# Patient Record
Sex: MALE | Race: WHITE | Employment: OTHER | URBAN - METROPOLITAN AREA
[De-identification: names, ages, dates, MRNs, and addresses within clinical notes are randomized per-mention and may not be internally consistent; named-entity substitution may affect disease eponyms.]

---

## 2022-02-28 ENCOUNTER — HOSPITAL ENCOUNTER (OUTPATIENT)
Dept: PHYSICAL THERAPY | Age: 62
Discharge: HOME | End: 2022-02-28
Payer: OTHER GOVERNMENT

## 2022-02-28 PROCEDURE — 97162 PT EVAL MOD COMPLEX 30 MIN: CPT

## 2022-02-28 PROCEDURE — 97140 MANUAL THERAPY 1/> REGIONS: CPT

## 2022-02-28 PROCEDURE — 97110 THERAPEUTIC EXERCISES: CPT

## 2022-02-28 NOTE — PROGRESS NOTES
[address] [place] [address] PHYSICAL THERAPY AT [address] [place] [address] Donald Plass 52, [address] [address] [place] [address], [address]  Phone: [phone]  Fax: [phone] / [address] [address] PHYSICAL THERAPY SERVICES  Patient Name: Alissa Guajardo : 1960   Medical   Diagnosis: Left shoulder pain [M25.512] Treatment Diagnosis: S/P Left Shoulder   Onset Date: 22     Referral Source: Ulysses Velez MD Start of Care [facility]): 2022   Prior Hospitalization: See medical history Provider #: [id number]   Prior Level of Function: Independent all ADL's   Comorbidities: TB, Arthritis, Alcohol use, Cancer(Skin), C7-C3 fusion   Medications: Verified on Patient Summary List   The Plan of Care and following information is based on the information from the initial evaluation.   ==================================================================================  Assessment / key information:  Patient is a 59 y.o.  yo male with Dx of Left shoulder pain [M25.512]. Patient underwent acromial decompression and bicep tenodesis on 22. He arrived to clinic in sling. He currently rates his pain as 5/10 at worst, 2/10 at best, primarily located at L shoulder globablly. He complains of difficulty and increased pain with movement. MD precautions are no active bicep contraction for 6 weeks. Objective Findings:  Shoulder/PROM: Flx: L = 130 deg, Scaption: L = 90 deg,  ER: L = 40 deg at 42 deg scaption deg,  IR: L = 50 deg  , MMT:NT. Incisions healing with no signs of infection.    Patient can benefit from PT interventions to improve ROM, strength, flexibility, decrease pain and scar tissue, to facilitate ADLs & overall functional status.   ==================================================================================  Eval Complexity: History HIGH Complexity :3+ comorbidities / personal factors will impact the outcome/ POC ;  Examination  MEDIUM Complexity : 3 Standardized tests and measures addressing body structure, function, activity limitation and / or participation in recreation ; Presentation MEDIUM Complexity : Evolving with changing characteristics ; Decision Making MEDIUM Complexity : FOTO score of 26-74; Overall Complexity MEDIUM  Problem List: pain affecting function, decrease ROM, decrease strength, impaired gait/ balance, decrease ADL/ functional abilitiies, decrease activity tolerance, decrease flexibility/ joint mobility and other FOTO 39/100   Treatment Plan may include any combination of the following: Therapeutic exercise, Therapeutic activities, Neuromuscular re-education, Physical agent/modality, Gait/balance training, Manual therapy, Patient education, Self Care training and Functional mobility training  Patient / Family readiness to learn indicated by: asking questions, trying to perform skills and interest  Persons(s) to be included in education: patient (P)  Barriers to Learning/Limitations: None  Measures taken:    Patient Goal (s): \"Get my Shoulder back\"   Patient self reported health status: good  Rehabilitation Potential: good   Short Term Goals: To be accomplished in  2-3  weeks:  1) Patient to be independent and compliant with initial HEP to prevent further disability. 2) Patient will report decreased c/o pain to < or = 3/10 at worst to facilitate increased tolerance to ADL's with manageable sx in the L shoulder. 3) Patient to increase PROM by 10-20 deg all planes to allow for AAROM and preparation for return to ADL's   Long Term Goals:  To be accomplished in  4-6  weeks:  1) Patient to be independent & compliant with progressive HEP in preparation for D/C.  2) Patient to demonstrate full PROM in all planes to prepare for AROM and ADL's  3.) Patient to report 50% improvement in overall mobility and independence  Frequency / Duration:   Patient to be seen  2-3  times per week for 4-6  weeks:  Patient / Caregiver education and instruction: self care, activity modification, brace/ splint application and exercises  G-Codes (GP): n/a  Therapist Signature: Marizol Walter PT Date: 4/64/7664   Certification Period: n/a Time: 8:40 AM   ===========================================================================================  I certify that the above Physical Therapy Services are being furnished while the patient is under my care. I agree with the treatment plan and certify that this therapy is necessary. Physician Signature:        Date:       Time:        Kerri Ness MD  Please sign and return to [facility] or you may fax the signed copy to [phone]. Thank you.

## 2022-02-28 NOTE — PROGRESS NOTES
PHYSICAL THERAPY - DAILY TREATMENT NOTE     Patient Name: Ajay Wright        Date: 2022  : 1960   YES Patient  Verified  Visit #:     Insurance: Payor:  / Plan: Facundo Benson 74 / Product Type:  /      In time: 797 Out time: 4536   Total Treatment Time: 50     Medicare/BCBS Time Tracking (below)   Total Timed Codes (min):   1:1 Treatment Time:       TREATMENT AREA =  Left shoulder pain [M25.512]    SUBJECTIVE    Pain Level (on 0 to 10 scale):  2  / 10   Medication Changes/New allergies or changes in medical history, any new surgeries or procedures?     NO    If yes, update Summary List   Subjective Functional Status/Changes:  []  No changes reported       See POC         OBJECTIVE  Modalities Rationale:     decrease edema, decrease inflammation and decrease pain to improve patient's ability to perform ADL's w/ less pain      min [] Estim, type/location:                                      []  att     []  unatt     []  w/US     []  w/ice    []  w/heat    min []  Mechanical Traction: type/lbs                   []  pro   []  sup   []  int   []  cont    []  before manual    []  after manual    min []  Ultrasound, settings/location:      min []  Iontophoresis w/ dexamethasone, location:                                               []  take home patch       []  in clinic   10 min [x]  Ice     []  Heat    location/position: Supine to L shoulder    min []  Vasopneumatic Device, press/temp:     min []  Other:    [x] Skin assessment post-treatment (if applicable):    [x]  intact    [x]  redness- no adverse reaction     []redness - adverse reaction:      10 min Therapeutic Exercise:  [x]  See flow sheet   Rationale:      increase ROM to improve the patients ability to prepare for ADL's and AROM     10 min Manual Therapy: Technique:      [] S/DTM []IASTM [x]PROM [] Passive Stretching   []manual TPR    []Jt manipulation:Gr I [] II []  III [] IV[] V[]  Treatment Area: Rationale:      decrease pain, increase ROM and increase tissue extensibility to improve patient's ability to prepare for AAROM. The manual therapy interventions were performed at a separate and distinct time from the therapeutic activities interventions. Billed With/As:   [x] TE   [] TA   [] Neuro   [] Self Care Patient Education: [x] Review HEP    [] Progressed/Changed HEP based on:   [] positioning   [] body mechanics   [] transfers   [] heat/ice application    [] other:        Other Objective/Functional Measures:    See POC  See TE Flow sheet     Post Treatment Pain Level (on 0 to 10) scale:   0  / 10     ASSESSMENT    Assessment/Changes in Function:     See POC     []  See Progress Note/Recertification   Patient will continue to benefit from skilled PT services to modify and progress therapeutic interventions, address functional mobility deficits, address ROM deficits, address strength deficits, analyze and address soft tissue restrictions, analyze and cue movement patterns, analyze and modify body mechanics/ergonomics and assess and modify postural abnormalities to attain remaining goals.       Progress toward goals / Updated goals:    See POC     PLAN    [x]  Upgrade activities as tolerated YES Continue plan of care   []  Discharge due to :    []  Other:      Therapist: Shanelle Gomez PT    Date: 2/28/2022 Time: 8:40 AM     Future Appointments   Date Time Provider Jamila Vasquez   2/28/2022  9:45 AM Ariana Manrique PT [facility] [address]

## 2022-03-02 ENCOUNTER — HOSPITAL ENCOUNTER (OUTPATIENT)
Dept: PHYSICAL THERAPY | Age: 62
Discharge: HOME | End: 2022-03-02
Payer: OTHER GOVERNMENT

## 2022-03-02 PROCEDURE — 97110 THERAPEUTIC EXERCISES: CPT

## 2022-03-02 PROCEDURE — 97140 MANUAL THERAPY 1/> REGIONS: CPT

## 2022-03-02 NOTE — PROGRESS NOTES
PHYSICAL THERAPY - DAILY TREATMENT NOTE     Patient Name: Rivera Alert        Date: 3/2/2022  : 1960   YES Patient  Verified  Visit #:     Insurance: Payor: DORINDA / Plan: Facundo Benson 74 / Product Type:  /      In time: 269 Out time: 113   Total Treatment Time: 45     Medicare/BCBS Time Tracking (below)   Total Timed Codes (min):   1:1 Treatment Time:       TREATMENT AREA =  Left shoulder pain [M25.512]    SUBJECTIVE    Pain Level (on 0 to 10 scale):  1-2  / 10   Medication Changes/New allergies or changes in medical history, any new surgeries or procedures? NO    If yes, update Summary List   Subjective Functional Status/Changes:  []  No changes reported       Functional improvements: \"Doing good.   Trying not to cheat by using my arm in the sling\"  Functional impairments: Decreased ROM and strength affecting ADL's         OBJECTIVE  Modalities Rationale:     decrease edema, decrease inflammation and decrease pain to improve patient's ability to perform ADL's w/o pain      min [] Estim, type/location:                                      []  att     []  unatt     []  w/US     []  w/ice    []  w/heat    min []  Mechanical Traction: type/lbs                   []  pro   []  sup   []  int   []  cont    []  before manual    []  after manual    min []  Ultrasound, settings/location:      min []  Iontophoresis w/ dexamethasone, location:                                               []  take home patch       []  in clinic   10 min [x]  Ice     []  Heat    location/position: Supine to L shoulder    min []  Vasopneumatic Device, press/temp:  If using vaso (only need to measure limb vaso being performed on)      pre-treatment girth :       post-treatment girth :       measured at (landmark location) :       min []  Other:    [x] Skin assessment post-treatment (if applicable):    [x]  intact    [x]  redness- no adverse reaction     []redness - adverse reaction:      15 min Therapeutic Exercise:  [x]  See flow sheet   Rationale:      increase ROM and increase strength to improve the patients ability to prepare for AAROM and improve ADL's     20 min Manual Therapy: Technique:      [x] S/DTM []IASTM [x]PROM [] Passive Stretching   []manual TPR    []Jt manipulation:Gr I [] II []  III [] IV[] V[]  Treatment Area: All planes Scar mobilization   Rationale:      increase ROM and increase tissue extensibility to improve patient's ability to prepare for AAROM and ADL's. The manual therapy interventions were performed at a separate and distinct time from the therapeutic activities interventions. Billed With/As:   [x] TE   [] TA   [] Neuro   [] Self Care Patient Education: [x] Review HEP    [] Progressed/Changed HEP based on:   [] positioning   [] body mechanics   [] transfers   [] heat/ice application    [] other:        Other Objective/Functional Measures:    Initiated therex per flow sheet. Post Treatment Pain Level (on 0 to 10) scale:   0  / 10     ASSESSMENT    Assessment/Changes in Function:     Patient tolerated initiation of treatment well. Continues to have increased muscle guarding with PROM. Slight improvement in PROM from last visit. Good demonstration of precautions and HEP. []  See Progress Note/Recertification   Patient will continue to benefit from skilled PT services to modify and progress therapeutic interventions, address functional mobility deficits, address ROM deficits, address strength deficits, analyze and address soft tissue restrictions, analyze and cue movement patterns, analyze and modify body mechanics/ergonomics and assess and modify postural abnormalities to attain remaining goals.       Progress toward goals / Updated goals:    Progressing towards newly established goals     PLAN    [x]  Upgrade activities as tolerated YES Continue plan of care   []  Discharge due to :    []  Other:      Therapist: Zachariah Dao PT    Date: 3/2/2022 Time: 8:06 AM     Future Appointments   Date Time Provider Jamila Vasquez   3/2/2022 10:45 AM Chas Flatter, PT [facility] [address]   3/7/2022  9:15 AM Chas Flatter, PT [facility] [address]   3/9/2022  9:15 AM Chas Flatter, PT [facility] [address]   3/14/2022  9:15 AM Chas Flatter, PT [facility] [address]   3/16/2022  9:15 AM Chas Flatter, PT [facility] [address]   3/21/2022  9:15 AM Chas Flatter, PT [facility] [address]   3/23/2022  9:15 AM Chas Flatter, PT [facility] [address]   3/28/2022  9:15 AM Chas Flatter, PT [facility] [address]   3/30/2022  9:15 AM Chas Flatter, PT [facility] [address]   4/4/2022  9:15 AM Chas Flatter, PT [facility] [address]   4/6/2022  9:15 AM Chas Flatter, PT [facility] [address]

## 2022-03-07 ENCOUNTER — HOSPITAL ENCOUNTER (OUTPATIENT)
Dept: PHYSICAL THERAPY | Age: 62
Discharge: HOME | End: 2022-03-07
Payer: OTHER GOVERNMENT

## 2022-03-07 PROCEDURE — 97110 THERAPEUTIC EXERCISES: CPT

## 2022-03-07 PROCEDURE — 97140 MANUAL THERAPY 1/> REGIONS: CPT

## 2022-03-07 NOTE — PROGRESS NOTES
PHYSICAL THERAPY - DAILY TREATMENT NOTE     Patient Name: Yesenia Souza        Date: 3/7/2022  : 1960   YES Patient  Verified  Visit #:   3   of   12  Insurance: Payor: DORINDA / Plan: Facundo Benson 74 / Product Type:  /      In time: 363 Out time: 955   Total Treatment Time: 40     Medicare/BCBS Time Tracking (below)   Total Timed Codes (min):   1:1 Treatment Time:       TREATMENT AREA =  Left shoulder pain [M25.512]    SUBJECTIVE    Pain Level (on 0 to 10 scale):  1  / 10   Medication Changes/New allergies or changes in medical history, any new surgeries or procedures? NO    If yes, update Summary List   Subjective Functional Status/Changes:  []  No changes reported       Functional improvements: \"I'm doing good. Trying not to cheat with bending my elbow. \"  Functional impairments: Decreased ROM and strength affecting ADL's.          OBJECTIVE  Modalities Rationale:     decrease inflammation and decrease pain to improve patient's ability to perform ADL's w/o pain      min [] Estim, type/location:                                      []  att     []  unatt     []  w/US     []  w/ice    []  w/heat    min []  Mechanical Traction: type/lbs                   []  pro   []  sup   []  int   []  cont    []  before manual    []  after manual    min []  Ultrasound, settings/location:      min []  Iontophoresis w/ dexamethasone, location:                                               []  take home patch       []  in clinic   10 min [x]  Ice     []  Heat    location/position: Supine to L shoulder post treatment    min []  Vasopneumatic Device, press/temp:  If using vaso (only need to measure limb vaso being performed on)      pre-treatment girth :       post-treatment girth :       measured at (landmark location) :       min []  Other:    [x] Skin assessment post-treatment (if applicable):    [x]  intact    [x]  redness- no adverse reaction     []redness - adverse reaction:      10 min Therapeutic Exercise:  [x]  See flow sheet   Rationale:      increase ROM and increase strength to improve the patients ability to prepare for AAROM and ADL's     20 min Manual Therapy: Technique:      [] S/DTM []IASTM [x]PROM [x] Passive Stretching   []manual TPR    []Jt manipulation:Gr I [] II []  III [] IV[] V[]  Treatment Area:  PROM all planes   Rationale:      increase ROM and increase tissue extensibility to improve patient's ability to improve ROM for progression to AROM and increased use with ADL's. The manual therapy interventions were performed at a separate and distinct time from the therapeutic activities interventions. Billed With/As:   [x] TE   [] TA   [] Neuro   [] Self Care Patient Education: [x] Review HEP    [] Progressed/Changed HEP based on:   [] positioning   [] body mechanics   [] transfers   [] heat/ice application    [] other:        Other Objective/Functional Measures:    PROM Flx:140, Scap: 115,      Post Treatment Pain Level (on 0 to 10) scale:   0  / 10     ASSESSMENT    Assessment/Changes in Function:     Improved PROM in all planes noted today. Continues to need vc's for no AROM of the elbow. []  See Progress Note/Recertification   Patient will continue to benefit from skilled PT services to modify and progress therapeutic interventions, address functional mobility deficits, address ROM deficits, address strength deficits, analyze and address soft tissue restrictions, analyze and cue movement patterns, analyze and modify body mechanics/ergonomics and assess and modify postural abnormalities to attain remaining goals. Progress toward goals / Updated goals:    1) Patient to be independent and compliant with initial HEP to prevent further disability. MET  2) Patient will report decreased c/o pain to < or = 3/10 at worst to facilitate increased tolerance to ADL's with manageable sx in the L shoulder.  MET  3) Patient to increase PROM by 10-20 deg all planes to allow for AAROM and preparation for return to ADL's Good Progress     PLAN    [x]  Upgrade activities as tolerated YES Continue plan of care   []  Discharge due to :    []  Other:      Therapist: Daisy Hernandez PT    Date: 3/7/2022 Time: 8:02 AM     Future Appointments   Date Time Provider Jamila Vasquez   3/7/2022  9:15 AM Hannah Vargas, PT [facility]   3/9/2022  9:15 AM Hannah Vargas, PT [facility]   3/14/2022  9:15 AM Hannah Vargas, PT [facility]   3/16/2022  9:15 AM Hannah Vargas, PT [facility]   3/21/2022  9:15 AM Hannah Vargas, PT [facility]   3/23/2022  9:15 AM Hannah Vargas, PT [facility]   4/4/2022  9:15 AM Hannah Vargas, PT [facility]   4/6/2022  9:15 AM Hannah Vargas, PT [facility]

## 2022-03-09 ENCOUNTER — HOSPITAL ENCOUNTER (OUTPATIENT)
Dept: PHYSICAL THERAPY | Age: 62
Discharge: HOME | End: 2022-03-09
Payer: OTHER GOVERNMENT

## 2022-03-09 PROCEDURE — 97140 MANUAL THERAPY 1/> REGIONS: CPT

## 2022-03-09 PROCEDURE — 97110 THERAPEUTIC EXERCISES: CPT

## 2022-03-09 NOTE — PROGRESS NOTES
PHYSICAL THERAPY - DAILY TREATMENT NOTE     Patient Name: Patricia Morrow        Date: 3/9/2022  : 1960   YES Patient  Verified  Visit #:     Insurance: Payor:  / Plan: Facundo Benson 74 / Product Type:  /      In time: 902 Out time: 955   Total Treatment Time: 40     Medicare/BCBS Time Tracking (below)   Total Timed Codes (min):   1:1 Treatment Time:       TREATMENT AREA =  Left shoulder pain [M25.512]    SUBJECTIVE    Pain Level (on 0 to 10 scale):  0  / 10   Medication Changes/New allergies or changes in medical history, any new surgeries or procedures? NO    If yes, update Summary List   Subjective Functional Status/Changes:  []  No changes reported       Functional improvements:  \"Kind of stiff today but doing ok\"  Functional impairments: Decreased strength and ROM limiting ADL's         OBJECTIVE  Modalities Rationale:     decrease inflammation and decrease pain to improve patient's ability to perform  ADL's      min [] Estim, type/location:                                      []  att     []  unatt     []  w/US     []  w/ice    []  w/heat    min []  Mechanical Traction: type/lbs                   []  pro   []  sup   []  int   []  cont    []  before manual    []  after manual    min []  Ultrasound, settings/location:      min []  Iontophoresis w/ dexamethasone, location:                                               []  take home patch       []  in clinic   10 min [x]  Ice     []  Heat    location/position: Supine to L shoulder    min []  Vasopneumatic Device, press/temp:  If using vaso (only need to measure limb vaso being performed on)      pre-treatment girth :       post-treatment girth :       measured at (landmark location) :       min []  Other:    [x] Skin assessment post-treatment (if applicable):    [x]  intact    [x]  redness- no adverse reaction     []redness - adverse reaction:      10 min Therapeutic Exercise:  [x]  See flow sheet   Rationale:      increase ROM and increase strength to improve the patients ability to prepare for AAROM     20 min Manual Therapy: Technique:      [] S/DTM []IASTM [x]PROM [x] Passive Stretching   []manual TPR    []Jt manipulation:Gr I [] II []  III [] IV[] V[]  Treatment Area:     Rationale:      increase ROM and increase tissue extensibility to improve patient's ability to prepare for AAROM and ADL's. The manual therapy interventions were performed at a separate and distinct time from the therapeutic activities interventions. Billed With/As:   [x] TE   [] TA   [] Neuro   [] Self Care Patient Education: [x] Review HEP    [] Progressed/Changed HEP based on:   [] positioning   [] body mechanics   [] transfers   [] heat/ice application    [] other:        Other Objective/Functional Measures:    140 flex, 100 scaption   Post Treatment Pain Level (on 0 to 10) scale:   0  / 10     ASSESSMENT    Assessment/Changes in Function:     Increased muscle guarding noted today in scaption. Increased difficulty with PROM. []  See Progress Note/Recertification   Patient will continue to benefit from skilled PT services to modify and progress therapeutic interventions, address functional mobility deficits, address ROM deficits, address strength deficits, analyze and address soft tissue restrictions, analyze and cue movement patterns, analyze and modify body mechanics/ergonomics and assess and modify postural abnormalities to attain remaining goals. Progress toward goals / Updated goals:    1) Patient to be independent and compliant with initial HEP to prevent further disability. MET  2) Patient will report decreased c/o pain to < or = 3/10 at worst to facilitate increased tolerance to ADL's with manageable sx in the L shoulder.  MET  3) Patient to increase PROM by 10-20 deg all planes to allow for AAROM and preparation for return to ADL's Good Progress     PLAN    [x]  Upgrade activities as tolerated YES Continue plan of care   []  Discharge due to :    []  Other:      Therapist: Yvon Duarte PT    Date: 3/9/2022 Time: 8:06 AM     Future Appointments   Date Time Provider Jamila Vasquez   3/9/2022  9:15 AM Jolene Esquivel, PT [facility]   3/14/2022  9:15 AM Jolene Esquivel, PT [facility]   3/16/2022  9:15 AM Jolene Esquivel, PT [facility]   3/21/2022  9:15 AM Jolene Esquivel, PT [facility]   3/23/2022  9:15 AM Jolene Esquivel, PT [facility]   4/4/2022  9:15 AM Jolene Esquviel, PT [facility]   4/6/2022  9:15 AM Jolene Esquivel, PT [facility]

## 2022-03-14 ENCOUNTER — HOSPITAL ENCOUNTER (OUTPATIENT)
Dept: PHYSICAL THERAPY | Age: 62
Discharge: HOME | End: 2022-03-14
Payer: OTHER GOVERNMENT

## 2022-03-14 PROCEDURE — 97110 THERAPEUTIC EXERCISES: CPT

## 2022-03-14 PROCEDURE — 97140 MANUAL THERAPY 1/> REGIONS: CPT

## 2022-03-14 NOTE — PROGRESS NOTES
PHYSICAL THERAPY - DAILY TREATMENT NOTE     Patient Name: Bianka Valdovinos        Date: 3/14/2022  : 1960   YES Patient  Verified  Visit #:     Insurance: Payor:  / Plan: Facundo Benson 74 / Product Type:  /      In time: 469 Out time: 955   Total Treatment Time: 40     Medicare/BCBS Time Tracking (below)   Total Timed Codes (min):   1:1 Treatment Time:       TREATMENT AREA =  Left shoulder pain [M25.512]    SUBJECTIVE    Pain Level (on 0 to 10 scale):  1  / 10   Medication Changes/New allergies or changes in medical history, any new surgeries or procedures? NO    If yes, update Summary List   Subjective Functional Status/Changes:  []  No changes reported       Functional improvements: \"Trying not to use it. \"  Functional impairments: Decreased ROM and strength affecting ADL's         OBJECTIVE  Modalities Rationale:     decrease inflammation and decrease pain to improve patient's ability to perform ADL's w/ less pain      min [] Estim, type/location:                                      []  att     []  unatt     []  w/US     []  w/ice    []  w/heat    min []  Mechanical Traction: type/lbs                   []  pro   []  sup   []  int   []  cont    []  before manual    []  after manual    min []  Ultrasound, settings/location:      min []  Iontophoresis w/ dexamethasone, location:                                               []  take home patch       []  in clinic   10 min [x]  Ice     []  Heat    location/position: Supine to L shoulder    min []  Vasopneumatic Device, press/temp:  If using vaso (only need to measure limb vaso being performed on)      pre-treatment girth :       post-treatment girth :       measured at (landmark location) :       min []  Other:    [x] Skin assessment post-treatment (if applicable):    [x]  intact    [x]  redness- no adverse reaction     []redness - adverse reaction:      10 min Therapeutic Exercise:  [x]  See flow sheet   Rationale: increase ROM and increase strength to improve the patients ability to prepare for AAROM w/ ADL's     20 min Manual Therapy: Technique:      [] S/DTM []IASTM []PROM [x] Passive Stretching   []manual TPR    []Jt manipulation:Gr I [] II []  III [] IV[] V[]  Treatment Area:  PROM all planes   Rationale:      decrease pain, increase ROM and increase tissue extensibility to improve patient's ability to prepare for AAROM. The manual therapy interventions were performed at a separate and distinct time from the therapeutic activities interventions. Other Objective/Functional Measures:    Progressed  strength and added 1# weight to wrist flx/ext  PROM flex 145, scap 130, ER 45 at 50 deg scap   Post Treatment Pain Level (on 0 to 10) scale:   0  / 10     ASSESSMENT    Assessment/Changes in Function:     Patient progressing slowly with ROM, continues to have increased tightness  In all planes with mild pain at [facility] [place] joint. []  See Progress Note/Recertification   Patient will continue to benefit from skilled PT services to modify and progress therapeutic interventions, address functional mobility deficits, address ROM deficits, address strength deficits, analyze and address soft tissue restrictions, analyze and cue movement patterns, analyze and modify body mechanics/ergonomics and assess and modify postural abnormalities to attain remaining goals. Progress toward goals / Updated goals:    1) Patient to be independent and compliant with initial HEP to prevent further disability.  MET  2) Patient will report decreased c/o pain to < or = 3/10 at worst to facilitate increased tolerance to ADL's with manageable sx in the L shoulder.  MET  3) Patient to increase PROM by 10-20 deg all planes to allow for AAROM and preparation for return to ADL's MET     PLAN    [x]  Upgrade activities as tolerated YES Continue plan of care   []  Discharge due to :    []  Other:      Therapist: Alex Joseph, PT    Date: 3/14/2022 Time: 8: 07 AM     Future Appointments   Date Time Provider Jamila Vasquez   3/14/2022  9:15 AM Rod Contreras, PT [facility]   3/16/2022  9:15 AM Rod Contreras, PT [facility]   3/21/2022  9:15 AM Rod Contreras, PT [facility]   3/23/2022  9:15 AM Rod Contreras, PT [facility]   4/4/2022  9:15 AM Rod Contreras, PT [facility]   4/6/2022  9:15 AM Rod Contreras, PT [facility]

## 2022-03-16 ENCOUNTER — HOSPITAL ENCOUNTER (OUTPATIENT)
Dept: PHYSICAL THERAPY | Age: 62
Discharge: HOME | End: 2022-03-16
Payer: OTHER GOVERNMENT

## 2022-03-16 PROCEDURE — 97530 THERAPEUTIC ACTIVITIES: CPT

## 2022-03-16 PROCEDURE — 97140 MANUAL THERAPY 1/> REGIONS: CPT

## 2022-03-16 PROCEDURE — 97110 THERAPEUTIC EXERCISES: CPT

## 2022-03-16 NOTE — PROGRESS NOTES
PHYSICAL THERAPY - DAILY TREATMENT NOTE     Patient Name: Josue Sanches        Date: 3/16/2022  : 1960   YES Patient  Verified  Visit #:     Insurance: Payor: DORINDA / Plan: Facundo Benson 74 / Product Type:  /      In time: 201 Out time: 9595   Total Treatment Time: 60     Medicare/BCBS Time Tracking (below)   Total Timed Codes (min):   1:1 Treatment Time:       TREATMENT AREA =  Left shoulder pain [M25.512]    SUBJECTIVE    Pain Level (on 0 to 10 scale):  1  / 10   Medication Changes/New allergies or changes in medical history, any new surgeries or procedures?     NO    If yes, update Summary List   Subjective Functional Status/Changes:  []  No changes reported       Functional improvements: Decreased pain overall  Functional impairments: Decreased AROM and strength affecting all ADL's         OBJECTIVE  Modalities Rationale:     decrease inflammation and decrease pain to improve patient's ability to perform ADL's w/o pain      min [] Estim, type/location:                                      []  att     []  unatt     []  w/US     []  w/ice    []  w/heat    min []  Mechanical Traction: type/lbs                   []  pro   []  sup   []  int   []  cont    []  before manual    []  after manual    min []  Ultrasound, settings/location:      min []  Iontophoresis w/ dexamethasone, location:                                               []  take home patch       []  in clinic   10 min [x]  Ice     []  Heat    location/position: Supine to Left shoulder following treatment session    min []  Vasopneumatic Device, press/temp:  If using vaso (only need to measure limb vaso being performed on)      pre-treatment girth :       post-treatment girth :       measured at (landmark location) :       min []  Other:    [x] Skin assessment post-treatment (if applicable):    [x]  intact    [x]  redness- no adverse reaction     []redness - adverse reaction:      25 min Therapeutic Exercise:  [x]  See flow sheet   Rationale:      increase ROM and increase strength to improve the patients ability to prepare for AROM w/ ADL's     10 min Therapeutic Activity: [x]  See flow sheet   Rationale:      increase ROM and increase strength to improve the patients ability to perform ADL's w/ increased ease     15 min Manual Therapy: Technique:      [] S/DTM []IASTM [x]PROM [x] Passive Stretching   []manual TPR    []Jt manipulation:Gr I [] II []  III [] IV[] V[]  Treatment Area:  Left shoulder all planes   Rationale:      decrease pain, increase ROM and increase tissue extensibility to improve patient's ability to perform ADL's w/ increased ease. The manual therapy interventions were performed at a separate and distinct time from the therapeutic activities interventions. Billed With/As:   [x] TE   [x] TA   [] Neuro   [] Self Care Patient Education: [x] Review HEP    [x] Progressed/Changed HEP based on:   [] positioning   [] body mechanics   [] transfers   [] heat/ice application    [x] other: Protocol       Other Objective/Functional Measures:    Progressed exercises/activities per flow sheet. Patient given written copy     Post Treatment Pain Level (on 0 to 10) scale:   0  / 10     ASSESSMENT    Assessment/Changes in Function:     Good tolerance to new exercises today. Continues to make gains in ROM. Limited in scaption today. []  See Progress Note/Recertification   Patient will continue to benefit from skilled PT services to modify and progress therapeutic interventions, address functional mobility deficits, address ROM deficits, address strength deficits, analyze and address soft tissue restrictions, analyze and cue movement patterns, analyze and modify body mechanics/ergonomics and assess and modify postural abnormalities to attain remaining goals.       Progress toward goals / Updated goals:  Progressing towards all LTG's:  1) Patient to be independent & compliant with progressive HEP in preparation for D/C.  2) Patient to demonstrate full PROM in all planes to prepare for AROM and ADL's  3.) Patient to report 50% improvement in overall mobility and independence     PLAN    [x]  Upgrade activities as tolerated YES Continue plan of care   []  Discharge due to :    []  Other:      Therapist: Juan Jose Leon PT    Date: 3/16/2022 Time: 8:23 AM     Future Appointments   Date Time Provider Jamila Vasquez   3/16/2022  9:15 AM Tonja Bhandari, PT [facility]   3/21/2022  9:15 AM Tonja Bhandari, PT [facility]   3/23/2022  9:15 AM Tonja Bhandari, PT [facility]   4/4/2022  9:15 AM Tonja Bhandari, PT [facility]   4/6/2022  9:15 AM Tonja Bhandari, PT [facility]   4/12/2022  9:00 AM Jonathan Morrell, PT [facility]   4/14/2022  9:45 AM Carlos Galaviz [facility]   4/18/2022  9:15 AM Tonja Bhandari, PT [facility]   4/20/2022  9:15 AM Tonja Bhandari, PT [facility]

## 2022-03-21 ENCOUNTER — HOSPITAL ENCOUNTER (OUTPATIENT)
Dept: PHYSICAL THERAPY | Age: 62
Discharge: HOME | End: 2022-03-21
Payer: OTHER GOVERNMENT

## 2022-03-21 PROCEDURE — 97140 MANUAL THERAPY 1/> REGIONS: CPT

## 2022-03-21 PROCEDURE — 97112 NEUROMUSCULAR REEDUCATION: CPT

## 2022-03-21 PROCEDURE — 97530 THERAPEUTIC ACTIVITIES: CPT

## 2022-03-21 PROCEDURE — 97110 THERAPEUTIC EXERCISES: CPT

## 2022-03-21 NOTE — PROGRESS NOTES
PHYSICAL THERAPY - DAILY TREATMENT NOTE     Patient Name: Delisa Ballard        Date: 3/21/2022  : 1960   YES Patient  Verified  Visit #:     Insurance: Payor: DORINDA / Plan: Facundo Benson 74 / Product Type:  /      In time: 660 Out time: 8186   Total Treatment Time: 60     Medicare/BCBS Time Tracking (below)   Total Timed Codes (min):   1:1 Treatment Time:       TREATMENT AREA =  Left shoulder pain [M25.512]    SUBJECTIVE    Pain Level (on 0 to 10 scale):  1  / 10   Medication Changes/New allergies or changes in medical history, any new surgeries or procedures? NO    If yes, update Summary List   Subjective Functional Status/Changes:  []  No changes reported       Functional improvements: \"I get to come out of my sling on Wednesday. \"  Functional impairments: Decreased strength and AROM affecting ADL's         OBJECTIVE  Modalities Rationale:     decrease inflammation and decrease pain to improve patient's ability to perform ADL's w/o pain      min [] Estim, type/location:                                      []  att     []  unatt     []  w/US     []  w/ice    []  w/heat    min []  Mechanical Traction: type/lbs                   []  pro   []  sup   []  int   []  cont    []  before manual    []  after manual    min []  Ultrasound, settings/location:      min []  Iontophoresis w/ dexamethasone, location:                                               []  take home patch       []  in clinic   10 min [x]  Ice     []  Heat    location/position: Supine to left shoulder    min []  Vasopneumatic Device, press/temp:  If using vaso (only need to measure limb vaso being performed on)      pre-treatment girth :       post-treatment girth :       measured at (landmark location) :       min []  Other:    [x] Skin assessment post-treatment (if applicable):    [x]  intact    [x]  redness- no adverse reaction     []redness - adverse reaction:      15 min Therapeutic Exercise:  [x]  See flow sheet Rationale:      increase ROM and increase strength to improve the patients ability to perform ADL's w/ good ROM and decreased pain     10 min Therapeutic Activity: []  See flow sheet   Rationale:      increase ROM and increase strength to improve the patients ability to perform dressing ADL's and prepare for AROM overhead     10 min Neuromuscular Re-ed: [x]  See flow sheet   Rationale:      increase ROM, increase strength and improve coordination to improve the patients ability to perform AROM w/ proper scapulohumeral rhythm and shoulder mechanics     15 min Manual Therapy: Technique:      [] S/DTM []IASTM [x]PROM [x] Passive Stretching   []manual TPR    [x]Jt manipulation:Gr I [] II [x]  III [] IV[] V[]  Treatment Area:  PROM all planes, gentle mobilization to dec muscle guarding   Rationale:      decrease pain, increase ROM and increase tissue extensibility to improve patient's ability to perform AAROM throughout full range. The manual therapy interventions were performed at a separate and distinct time from the therapeutic activities interventions. Billed With/As:   [x] TE   [x] TA   [x] Neuro   [] Self Care Patient Education: [x] Review HEP    [] Progressed/Changed HEP based on:   [] positioning   [] body mechanics   [] transfers   [] heat/ice application    [] other:        Other Objective/Functional Measures: Added sleeper stretch and posterior capsule stretch, S/L Abd and ER, Active elbow flexion w/o resistance, and supine horizontal ABD w/o resistance   Post Treatment Pain Level (on 0 to 10) scale:   0  / 10     ASSESSMENT    Assessment/Changes in Function:     Patient tolerated new therex and activity well. Notable bicep fatigue but no increase in pain. Improved flexion and ABD following grade II oscillations and mobs.   Significant tightness in IR.     []  See Progress Note/Recertification   Patient will continue to benefit from skilled PT services to modify and progress therapeutic interventions, address functional mobility deficits, address ROM deficits, address strength deficits, analyze and address soft tissue restrictions, analyze and cue movement patterns, analyze and modify body mechanics/ergonomics and assess and modify postural abnormalities to attain remaining goals.       Progress toward goals / Updated goals:  Good Progress towards all LTG's:  1) Patient to be independent & compliant with progressive HEP in preparation for D/C.  2) Patient to demonstrate full PROM in all planes to prepare for AROM and ADL's  3.) Patient to report 50% improvement in overall mobility and independence     PLAN    [x]  Upgrade activities as tolerated YES Continue plan of care   []  Discharge due to :    []  Other:      Therapist: Richard Morton PT    Date: 3/21/2022 Time: 8:07 AM     Future Appointments   Date Time Provider Jamila Vasquez   3/21/2022  9:15 AM Shania Ripa, PT [facility]   3/23/2022  9:15 AM Shania Ripa, PT [facility]   4/4/2022  9:15 AM Shania Ripa, PT [facility]   4/6/2022  9:15 AM Shania Ripa, PT [facility]   4/12/2022  9:00 AM Slade Barber, PT [facility]   4/14/2022  9:45 AM Gurney Denver [facility]   4/18/2022  9:15 AM Shania Ripa, PT [facility]   4/20/2022  9:15 AM Shania Ripa, PT [facility]

## 2022-03-23 ENCOUNTER — HOSPITAL ENCOUNTER (OUTPATIENT)
Dept: PHYSICAL THERAPY | Age: 62
Discharge: HOME | End: 2022-03-23
Payer: OTHER GOVERNMENT

## 2022-03-23 PROCEDURE — 97530 THERAPEUTIC ACTIVITIES: CPT

## 2022-03-23 PROCEDURE — 97112 NEUROMUSCULAR REEDUCATION: CPT

## 2022-03-23 PROCEDURE — 97140 MANUAL THERAPY 1/> REGIONS: CPT

## 2022-03-23 PROCEDURE — 97110 THERAPEUTIC EXERCISES: CPT

## 2022-03-23 NOTE — PROGRESS NOTES
PHYSICAL THERAPY - DAILY TREATMENT NOTE     Patient Name: Mynor Sarabia        Date: 3/23/2022  : 1960   YES Patient  Verified  Visit #:     Insurance: Payor:  / Plan: Facundo Benson 74 / Product Type:  /      In time: 343 Out time: 6403   Total Treatment Time: 60     Medicare/BCBS Time Tracking (below)   Total Timed Codes (min):   1:1 Treatment Time:       TREATMENT AREA =  Left shoulder pain [M25.512]    SUBJECTIVE    Pain Level (on 0 to 10 scale):  1  / 10   Medication Changes/New allergies or changes in medical history, any new surgeries or procedures?     NO    If yes, update Summary List   Subjective Functional Status/Changes:  []  No changes reported       Functional improvements: \"I am out of the sling today\"  Functional impairments: decreased AROM and strength affecting all ADL's         OBJECTIVE  Modalities Rationale:     decrease inflammation and decrease pain to improve patient's ability to perform ADL's w/o pain      min [] Estim, type/location:                                      []  att     []  unatt     []  w/US     []  w/ice    []  w/heat    min []  Mechanical Traction: type/lbs                   []  pro   []  sup   []  int   []  cont    []  before manual    []  after manual    min []  Ultrasound, settings/location:      min []  Iontophoresis w/ dexamethasone, location:                                               []  take home patch       []  in clinic   10 min [x]  Ice     []  Heat    location/position: Supine to L shoulder following treatment    min []  Vasopneumatic Device, press/temp:  If using vaso (only need to measure limb vaso being performed on)      pre-treatment girth :       post-treatment girth :       measured at (landmark location) :       min []  Other:    [x] Skin assessment post-treatment (if applicable):    [x]  intact    [x]  redness- no adverse reaction     []redness - adverse reaction:      10 min Therapeutic Exercise:  [x]  See flow sheet   Rationale:      increase ROM and increase strength to improve the patients ability to reach overhead     10 min Therapeutic Activity: [x]  See flow sheet   Rationale:      increase ROM and increase strength to improve the patients ability to perform ADL's such as dressing and reaching while driving     15 min Neuromuscular Re-ed: [x]  See flow sheet Rhythmic stabilization followed by PNF D2 in supine with assist and passive stretch at end range. Rationale:      increase strength, improve coordination and increase proprioception to improve the patients ability to perform ADL's w/ proper shoulder mechanics     15 min Manual Therapy: Technique:      [] S/DTM []IASTM [x]PROM [x] Passive Stretching   []manual TPR    [x]Jt manipulation:Gr I [] II [x]  III [x] IV[] V[]  Treatment Area: post/inf jt mobs, PROM all planes     Rationale:      decrease pain, increase ROM and increase tissue extensibility to improve patient's ability to perform OH activities. The manual therapy interventions were performed at a separate and distinct time from the therapeutic activities interventions. Billed With/As:   [x] TE   [x] TA   [x] Neuro   [] Self Care Patient Education: [x] Review HEP    [] Progressed/Changed HEP based on:   [] positioning   [] body mechanics   [] transfers   [] heat/ice application    [] other:        Other Objective/Functional Measures:    Supine PNF D2 pattern w/ assist. Increased reps per flow sheet  PROM: Flex 140, scaption: 138, ER 65, IR 55     Post Treatment Pain Level (on 0 to 10) scale:   0  / 10     ASSESSMENT    Assessment/Changes in Function:     Patient continues to make slow gains in A/PROM and strength as noted per increased tolerance to AROM activities.      []  See Progress Note/Recertification   Patient will continue to benefit from skilled PT services to modify and progress therapeutic interventions, address functional mobility deficits, address ROM deficits, address strength deficits, analyze and address soft tissue restrictions, analyze and cue movement patterns, analyze and modify body mechanics/ergonomics and assess and modify postural abnormalities to attain remaining goals.       Progress toward goals / Updated goals:    Good Progress towards all LTG's:  1) Patient to be independent & compliant with progressive HEP in preparation for D/C.  2) Patient to demonstrate full PROM in all planes to prepare for AROM and ADL's  3.) Patient to report 50% improvement in overall mobility and independence     PLAN    [x]  Upgrade activities as tolerated YES Continue plan of care   []  Discharge due to :    []  Other:      Therapist: Juan Jose Leon PT    Date: 3/23/2022 Time: 8:02 AM     Future Appointments   Date Time Provider Jamila Vasquez   3/23/2022  9:15 AM Tonja Bhandari PT [facility]   4/4/2022  9:15 AM Tonja Bhandari PT [facility]   4/6/2022  9:15 AM Tonja Bhandari PT [facility]   4/12/2022  9:00 AM Jonathan Morrell PT [facility]   4/14/2022  9:45 AM Carlos Galaviz [facility]   4/18/2022  9:15 AM Tonja Bhandari PT [facility]   4/20/2022  9:15 AM Tonja Bhandari PT [facility]

## 2022-03-28 ENCOUNTER — APPOINTMENT (OUTPATIENT)
Dept: PHYSICAL THERAPY | Age: 62
End: 2022-03-28
Payer: OTHER GOVERNMENT

## 2022-03-30 ENCOUNTER — APPOINTMENT (OUTPATIENT)
Dept: PHYSICAL THERAPY | Age: 62
End: 2022-03-30
Payer: OTHER GOVERNMENT

## 2022-04-04 ENCOUNTER — HOSPITAL ENCOUNTER (OUTPATIENT)
Dept: PHYSICAL THERAPY | Age: 62
Discharge: HOME | End: 2022-04-04
Payer: OTHER GOVERNMENT

## 2022-04-04 PROCEDURE — 97140 MANUAL THERAPY 1/> REGIONS: CPT

## 2022-04-04 PROCEDURE — 97110 THERAPEUTIC EXERCISES: CPT

## 2022-04-04 PROCEDURE — 97112 NEUROMUSCULAR REEDUCATION: CPT

## 2022-04-04 PROCEDURE — 97530 THERAPEUTIC ACTIVITIES: CPT

## 2022-04-04 NOTE — PROGRESS NOTES
PHYSICAL THERAPY - DAILY TREATMENT NOTE     Patient Name: Ferdinand Luna        Date: 2022  : 1960   YES Patient  Verified  Visit #:     Insurance: Payor:  / Plan: Facundo Benson 74 / Product Type:  /      In time: 426 Out time: 5910   Total Treatment Time: 60     Medicare/BCBS Time Tracking (below)   Total Timed Codes (min):   1:1 Treatment Time:       TREATMENT AREA =  Left shoulder pain [M25.512]    SUBJECTIVE    Pain Level (on 0 to 10 scale):  1 / 10   Medication Changes/New allergies or changes in medical history, any new surgeries or procedures? NO    If yes, update Summary List   Subjective Functional Status/Changes:  []  No changes reported       Functional improvements: \"I tried not to use it on my trip. \"  Functional impairments: Decreased AROM and strength affecting ADL's         OBJECTIVE  Modalities Rationale:     decrease inflammation and decrease pain to improve patient's ability to perform ADL's w/o pain      min [] Estim, type/location:                                      []  att     []  unatt     []  w/US     []  w/ice    []  w/heat    min []  Mechanical Traction: type/lbs                   []  pro   []  sup   []  int   []  cont    []  before manual    []  after manual    min []  Ultrasound, settings/location:      min []  Iontophoresis w/ dexamethasone, location:                                               []  take home patch       []  in clinic   10 min [x]  Ice     []  Heat    location/position: Supine to left shoulder    min []  Vasopneumatic Device, press/temp:  If using vaso (only need to measure limb vaso being performed on)      pre-treatment girth :       post-treatment girth :       measured at (landmark location) :       min []  Other:    [x] Skin assessment post-treatment (if applicable):    [x]  intact    [x]  redness- no adverse reaction     []redness - adverse reaction:      15 min Therapeutic Exercise:  [x]  See flow sheet   Rationale: increase ROM and increase strength to improve the patients ability to perform all ADL's      10 min Therapeutic Activity: [x]  See flow sheet   Rationale:      increase ROM and increase strength to improve the patients ability to perform dressing ADL's and overhead activities     10 min Neuromuscular Re-ed: [x]  See flow sheet   Rationale:      increase ROM, increase strength and improve coordination to improve the patients ability to perform reaching ADL's w/ proper scapular control and decreased pain     15 min Manual Therapy: Technique:      [] S/DTM []IASTM [x]PROM [x] Passive Stretching   []manual TPR    [x]Jt manipulation:Gr I [] II []  III [x] IV[] V[]  Treatment Area:     Rationale:      decrease pain, increase ROM and increase tissue extensibility to improve patient's ability to perform ADL's w/ pain and full ROM. The manual therapy interventions were performed at a separate and distinct time from the therapeutic activities interventions. Billed With/As:   [x] TE   [x] TA   [x] Neuro   [] Self Care Patient Education: [x] Review HEP    [] Progressed/Changed HEP based on:   [] positioning   [] body mechanics   [] transfers   [] heat/ice application    [] other:        Other Objective/Functional Measures:    Progressed per flow sheet   Post Treatment Pain Level (on 0 to 10) scale:   0  / 10     ASSESSMENT    Assessment/Changes in Function:     See PN     [x]  See Progress Note/Recertification   Patient will continue to benefit from skilled PT services to modify and progress therapeutic interventions, address functional mobility deficits, address ROM deficits, address strength deficits, analyze and address soft tissue restrictions, analyze and cue movement patterns, analyze and modify body mechanics/ergonomics and assess and modify postural abnormalities to attain remaining goals.       Progress toward goals / Updated goals:    See PN     PLAN    [x]  Upgrade activities as tolerated YES Continue plan of care   []  Discharge due to :    []  Other:      Therapist: Dorina Hernandez PT    Date: 4/4/2022 Time: 8:01 AM     Future Appointments   Date Time Provider Jamila Vasquez   4/4/2022  9:15 AM Josh Patrick, PT [facility]   4/6/2022  9:15 AM Josh Patrick, PT [facility]   4/12/2022  9:00 AM José Baker, PT [facility]   4/14/2022  9:45 AM Chad Stockton [facility]   4/18/2022  9:15 AM Josh Patrick, PT [facility]   4/20/2022  9:15 AM Josh Patrick, PT [facility]

## 2022-04-04 NOTE — PROGRESS NOTES
[address] [place] [address] PHYSICAL THERAPY AT [facility] [address] . [address], [address] -   Phone: [phone]  Fax: [phone] OF [facility] PHYSICAL THERAPY          Patient Name: Melody Heck : 1960   Treatment/Medical Diagnosis: Left shoulder pain [M25.512]   Onset Date: 22    Referral Source: Moody Moyer MD [facility]): 22   Prior Hospitalization: See Medical History Provider #: [id number]   Prior Level of Function: Independent all ADL's   Comorbidities: TB, Arthritis, Alcohol use, Skin Cancer, C7-C3 fusion   Medications: Verified on Patient Summary List   Visits from [facility]: 9 Missed Visits: 0     SUMMARY OF TREATMENT  Instruction in HEP, therapeutic exercise/activities per protocol, manual therapy for ROM and soft tissue restrictions, modalities: cryotherapy  CURRENT STATUS  Mr Brenden Carrasco continues to make gains in his physical therapy consistently reporting decreased pain and increased functional use of the left UE. His current A/PROM is as follows: Flex:100/160, Scaption: 70/137, ER: C6/55, IR:L3/57. MMT:3-/5. Previous Goals:  1) Patient to be independent and compliant with initial HEP to prevent further disability. 2) Patient will report decreased c/o pain to < or = 3/10 at worst to facilitate increased tolerance to ADL's with manageable sx in the L shoulder. 3) Patient to increase PROM by 10-20 deg all planes to allow for AAROM and preparation for return to ADL's   Prior Level/Current Level:  1) Prior Level: N/A   Current Level:  Independent with current HEP   Goal Met? yes  2) Prior Level: 5/10   Current Level: 0-1/10   Goal Met? yes  3) Prior Level: Flx: L = 130 deg, Scaption: L = 90 deg,  ER: L = 40 deg at 42 deg scaption deg,  IR: L = 50 deg    Current Level: See Above   Goal Met? yes    Problem List: pain affecting function, decrease ROM, decrease strength, decrease ADL/ functional abilitiies, decrease activity tolerance and decrease flexibility/ joint mobility   Treatment Plan may include any combination of the following: Therapeutic exercise, Therapeutic activities, Neuromuscular re-education, Physical agent/modality, Manual therapy, Patient education, Self Care training and Functional mobility training  Patient Goal(s) has been updated and includes:      Goals for this certification period include and are to be achieved in   4  weeks:  1) Patient to be independent & compliant with progressive HEP in preparation for D/C.  2) Patient to demonstrate full AROM in all planes to allow for return to PLF  3.) Patient to increase FOTO to >/= to 66 indicating improved functional mobility and independence  Frequency / Duration:   Patient to be seen   2   times per week for   4-6    weeks:    Assessments/Recommendations: Recommend continued PT to allow pt to achieve all LTG's  If you have any questions/comments please contact us directly at [phone]. Thank you for allowing us to assist in the care of your patient. Therapist Signature: Boogie Don PT Date: 1/0/9446   Certification Period:  Reporting Period: n/a  n/a Time: 8:02 AM   NOTE TO PHYSICIAN:  PLEASE COMPLETE THE ORDERS BELOW AND FAX TO   [facility]: [phone]  If you are unable to process this request in 24 hours please contact our office: [phone]    ___ I have read the above report and request that my patient continue as recommended.   ___ I have read the above report and request that my patient continue therapy with the following changes/special instructions:_________________________________________________________   ___ I have read the above report and request that my patient be discharged from therapy.      Physician Signature:        Date:            García Lopez MD  Time:

## 2022-04-06 ENCOUNTER — HOSPITAL ENCOUNTER (OUTPATIENT)
Dept: PHYSICAL THERAPY | Age: 62
Discharge: HOME | End: 2022-04-06
Payer: OTHER GOVERNMENT

## 2022-04-06 PROCEDURE — 97140 MANUAL THERAPY 1/> REGIONS: CPT

## 2022-04-06 PROCEDURE — 97110 THERAPEUTIC EXERCISES: CPT

## 2022-04-06 PROCEDURE — 97530 THERAPEUTIC ACTIVITIES: CPT

## 2022-04-06 NOTE — PROGRESS NOTES
PHYSICAL THERAPY - DAILY TREATMENT NOTE     Patient Name: Windy Cross        Date: 2022  : 1960   YES Patient  Verified  Visit #:   10   of   12  Insurance: Payor: DORINDA / Plan: Facundo Benson 74 / Product Type:  /      In time: 980 Out time:    Total Treatment Time: 60     Medicare/BCBS Time Tracking (below)   Total Timed Codes (min):   1:1 Treatment Time:       TREATMENT AREA =  Left shoulder pain [M25.512]    SUBJECTIVE    Pain Level (on 0 to 10 scale):  1  / 10   Medication Changes/New allergies or changes in medical history, any new surgeries or procedures?     NO    If yes, update Summary List   Subjective Functional Status/Changes:  []  No changes reported       Functional improvements: \"I am sore today, I think I slept on it funny\"  Functional impairments: Decreased ROM and strength affecting ADL's         OBJECTIVE  Modalities Rationale:     decrease inflammation and decrease pain to improve patient's ability to perform all ADL's w/o pain      min [] Estim, type/location:                                      []  att     []  unatt     []  w/US     []  w/ice    []  w/heat    min []  Mechanical Traction: type/lbs                   []  pro   []  sup   []  int   []  cont    []  before manual    []  after manual    min []  Ultrasound, settings/location:      min []  Iontophoresis w/ dexamethasone, location:                                               []  take home patch       []  in clinic   10 min [x]  Ice     []  Heat    location/position: Supine to Left shoulder following treatment    min []  Vasopneumatic Device, press/temp:  If using vaso (only need to measure limb vaso being performed on)      pre-treatment girth :       post-treatment girth :       measured at (landmark location) :       min []  Other:    [x] Skin assessment post-treatment (if applicable):    [x]  intact    [x]  redness- no adverse reaction     []redness - adverse reaction:      20 min Therapeutic Exercise:  [x]  See flow sheet   Rationale:      increase ROM and increase strength to improve the patients ability to perform all ADL's w/o difficulty     15 min Therapeutic Activity: [x]  See flow sheet   Rationale:      increase ROM, increase strength and improve coordination to improve the patients ability to perform reaching above 90 w/ proper mechanics and no pain     15 min Manual Therapy: Technique:      [] S/DTM []IASTM [x]PROM [x] Passive Stretching   []manual TPR    []Jt manipulation:Gr I [] II []  III [x] IV[] V[]  Treatment Area: Inf/post cap mobs followed by PROM all planes   Rationale:      decrease pain, increase ROM and increase tissue extensibility to improve patient's ability to perform all ADL's w/ full ROM and no pain. The manual therapy interventions were performed at a separate and distinct time from the therapeutic activities interventions. Billed With/As:   [x] TE   [x] TA   [] Neuro   [] Self Care Patient Education: [x] Review HEP    [] Progressed/Changed HEP based on:   [] positioning   [] body mechanics   [] transfers   [] heat/ice application    [] other:        Other Objective/Functional Measures:    Progressed per flow sheet   Post Treatment Pain Level (on 0 to 10) scale:   0  / 10     ASSESSMENT    Assessment/Changes in Function:     Improved ROM noted today w/ MT. Patient continues to make gains in strength w/ less pain. []  See Progress Note/Recertification   Patient will continue to benefit from skilled PT services to modify and progress therapeutic interventions, address functional mobility deficits, address ROM deficits, address strength deficits, analyze and address soft tissue restrictions, analyze and cue movement patterns, analyze and modify body mechanics/ergonomics and assess and modify postural abnormalities to attain remaining goals.       Progress toward goals / Updated goals:    Progressing towards recently updated goals     PLAN    [x]  Upgrade activities as tolerated YES Continue plan of care   []  Discharge due to :    []  Other:      Therapist: Hazel Mckoy PT    Date: 4/6/2022 Time: 8:01 AM     Future Appointments   Date Time Provider Jamila Vasquez   4/6/2022  9:15 AM Chad Phy, PT [facility]   4/12/2022  9:00 AM Darwin Judge, [facility]   4/14/2022  9:45 AM Shelia Peters [facility]   4/18/2022  9:15 AM Chad Phy, PT [facility]   4/20/2022  9:15 AM Chad Phy, PT [facility]

## 2022-04-12 ENCOUNTER — HOSPITAL ENCOUNTER (OUTPATIENT)
Dept: PHYSICAL THERAPY | Age: 62
Discharge: HOME | End: 2022-04-12
Payer: OTHER GOVERNMENT

## 2022-04-12 PROCEDURE — 97110 THERAPEUTIC EXERCISES: CPT

## 2022-04-12 PROCEDURE — 97530 THERAPEUTIC ACTIVITIES: CPT

## 2022-04-12 PROCEDURE — 97140 MANUAL THERAPY 1/> REGIONS: CPT

## 2022-04-14 ENCOUNTER — HOSPITAL ENCOUNTER (OUTPATIENT)
Dept: PHYSICAL THERAPY | Age: 62
Discharge: HOME | End: 2022-04-14
Payer: OTHER GOVERNMENT

## 2022-04-14 PROCEDURE — 97110 THERAPEUTIC EXERCISES: CPT

## 2022-04-14 PROCEDURE — 97530 THERAPEUTIC ACTIVITIES: CPT

## 2022-04-14 PROCEDURE — 97140 MANUAL THERAPY 1/> REGIONS: CPT

## 2022-04-14 NOTE — PROGRESS NOTES
PHYSICAL THERAPY - DAILY TREATMENT NOTE    Patient Name: Lilibeth Thompson        Date: 2022  : 1960   YES Patient  Verified  Visit #:   12   of   15  Insurance: Payor: DORINDA / Plan: Facundo Benson 74 / Product Type:  /      In time: 160 Out time: 2   Total Treatment Time: 55     BCBS/Medicare Time Tracking (below)   Total Timed Codes (min):  NA 1:1 Treatment Time:  NA     TREATMENT AREA =  Left shoulder pain [M25.512]    SUBJECTIVE  Pain Level (on 0 to 10 scale):  0  / 10   Medication Changes/New allergies or changes in medical history, any new surgeries or procedures? NO    If yes, update Summary List   Subjective Functional Status/Changes:  []  No changes reported     Patient reports his arm is doing good. Feels stronger every day.           Modalities Rationale:     decrease inflammation and decrease pain to improve patient's ability to perform unlimited ADLs    min [] Estim, type/location:                                      []  att     []  unatt     []  w/US     []  w/ice    []  w/heat    min []  Mechanical Traction: type/lbs                   []  pro   []  sup   []  int   []  cont    []  before manual    []  after manual    min []  Ultrasound, settings/location:      min []  Iontophoresis w/ dexamethasone, location:                                               []  take home patch       []  in clinic   10 min [x]  Ice     []  Heat    location/position: L shoulder in supine     min []  Vasopneumatic Device, press/temp:    If using vaso (only need to measure limb vaso being performed on)      pre-treatment girth :       post-treatment girth :       measured at (landmark location) :      min []  Other:    [] Skin assessment post-treatment (if applicable):    []  intact    []  redness- no adverse reaction                  []redness - adverse reaction:        20 min Therapeutic Exercise:  [x]  See flow sheet   Rationale:      increase ROM, increase strength, improve coordination, improve balance and increase proprioception to improve the patients ability to perform unlimited ADLS     10 min Manual Therapy: PROM into flexion,scaption, abduction and ER   Rationale:      decrease pain, increase ROM and increase tissue extensibility to improve patient's ability to perform pain free ADLs  The manual therapy interventions were performed at a separate and distinct time from the therapeutic activities interventions. 15 min Therapeutic Activity: [x]  See flow sheet   Rationale:    increase ROM, increase strength, improve coordination, improve balance and increase proprioception to improve the patients ability to improve ease with lifting and pushing       Billed With/As:   [] TE   [] TA   [] Neuro   [] Self Care Patient Education: [x] Review HEP    [] Progressed/Changed HEP based on:   [] positioning   [] body mechanics   [] transfers   [] heat/ice application    [] other:      Other Objective/Functional Measures:    Increased reps per flow sheet      Post Treatment Pain Level (on 0 to 10) scale:   0  / 10     ASSESSMENT  Assessment/Changes in Function:     Patient encouraged on focusing increasing overall endurance of the L shoulder and continuing self stretching at home.      []  See Progress Note/Recertification   Patient will continue to benefit from skilled PT services to modify and progress therapeutic interventions, address functional mobility deficits, address ROM deficits, address strength deficits, analyze and address soft tissue restrictions, analyze and cue movement patterns, analyze and modify body mechanics/ergonomics, assess and modify postural abnormalities, address imbalance/dizziness and instruct in home and community integration to attain remaining goals. Progress toward goals / Updated goals:    Progressing towards LTG 2.       PLAN  [x]  Upgrade activities as tolerated YES Continue plan of care   []  Discharge due to :    []  Other:      Therapist: Marta Talley Date: 4/14/2022 Time: 11:13 AM     Future Appointments   Date Time Provider Jamila Vasquez   4/18/2022  9:15 AM Jesusita Christianson PT [facility] [place] [facility]   4/20/2022  9:15 AM Jesusita Christianson PT [facility]

## 2022-04-18 ENCOUNTER — HOSPITAL ENCOUNTER (OUTPATIENT)
Dept: PHYSICAL THERAPY | Age: 62
Discharge: HOME | End: 2022-04-18
Payer: OTHER GOVERNMENT

## 2022-04-18 PROCEDURE — 97530 THERAPEUTIC ACTIVITIES: CPT

## 2022-04-18 PROCEDURE — 97140 MANUAL THERAPY 1/> REGIONS: CPT

## 2022-04-18 PROCEDURE — 97110 THERAPEUTIC EXERCISES: CPT

## 2022-04-18 NOTE — PROGRESS NOTES
PHYSICAL THERAPY - DAILY TREATMENT NOTE     Patient Name: Ayaz Clarke        Date: 2022  : 1960   YES Patient  Verified  Visit #:   15   of   15  Insurance: Payor: DORINDA / Plan: Elza Andrade / Product Type:  /      In time: 354 Out time: 1020   Total Treatment Time: 65     Medicare/[employer] Time Tracking (below)   Total Timed Codes (min):   1:1 Treatment Time:       TREATMENT AREA =  Left shoulder pain [M25.512]    SUBJECTIVE    Pain Level (on 0 to 10 scale):  0  / 10   Medication Changes/New allergies or changes in medical history, any new surgeries or procedures?     NO    If yes, update Summary List   Subjective Functional Status/Changes:  []  No changes reported       Functional improvements: \"It's doing ok, the acromion clicks a lot\"  Functional impairments: Decreased strength and ROM w/ ADL's         OBJECTIVE  Modalities Rationale:     decrease inflammation and decrease pain to improve patient's ability to perform ADL's w/o pain      min [] Estim, type/location:                                      []  att     []  unatt     []  w/US     []  w/ice    []  w/heat    min []  Mechanical Traction: type/lbs                   []  pro   []  sup   []  int   []  cont    []  before manual    []  after manual    min []  Ultrasound, settings/location:      min []  Iontophoresis w/ dexamethasone, location:                                               []  take home patch       []  in clinic   10 min [x]  Ice     []  Heat    location/position: Supine to L shoulder following treamtent    min []  Vasopneumatic Device, press/temp:  If using vaso (only need to measure limb vaso being performed on)      pre-treatment girth :       post-treatment girth :       measured at (landmark location) :       min []  Other:    [x] Skin assessment post-treatment (if applicable):    [x]  intact    [x]  redness- no adverse reaction     []redness - adverse reaction:      25 min Therapeutic Exercise:  [x]  See flow sheet   Rationale:      increase ROM and increase strength to improve the patients ability to perform all ADL's     20 min Therapeutic Activity: [x]  See flow sheet   Rationale:      increase ROM, increase strength and improve coordination to improve the patients ability to perform overhead activities and daily dressing/grooming     10 min Manual Therapy: Technique:      [] S/DTM []IASTM [x]PROM [x] Passive Stretching   []manual TPR    []Jt manipulation:Gr I [] II [x]  III [x] IV[x] V[]  Treatment Area: A/P inf glides followed by PROM all planes    Rationale:      decrease pain, increase ROM and increase tissue extensibility to improve patient's ability to perform ADL's and overhead activities. The manual therapy interventions were performed at a separate and distinct time from the therapeutic activities interventions. Billed With/As:   [x] TE   [x] TA   [] Neuro   [] Self Care Patient Education: [x] Review HEP    [] Progressed/Changed HEP based on:   [] positioning   [] body mechanics   [] transfers   [] heat/ice application    [] other:        Other Objective/Functional Measures: Added TB row, progressed per flow sheet     Post Treatment Pain Level (on 0 to 10) scale:   0  / 10     ASSESSMENT    Assessment/Changes in Function:     Continues to make gains in ROM and strength. Improved tolerance to PNF and supine abduction today. []  See Progress Note/Recertification   Patient will continue to benefit from skilled PT services to modify and progress therapeutic interventions, address functional mobility deficits, address ROM deficits, address strength deficits, analyze and address soft tissue restrictions, analyze and cue movement patterns, analyze and modify body mechanics/ergonomics and assess and modify postural abnormalities to attain remaining goals.       Progress toward goals / Updated goals:    2) Patient to demonstrate full AROM in all planes to allow for return to PLF      PLAN    [x]  Upgrade activities as tolerated YES Continue plan of care   []  Discharge due to :    []  Other:      Therapist: Hazel Mckoy PT    Date: 4/18/2022 Time: 8:02 AM     Future Appointments   Date Time Provider Jamila Vasquez   4/18/2022  9:15 AM Chad Emery PT [facility]   4/20/2022  9:15 AM Chad Emery PT [facility]

## 2022-04-20 ENCOUNTER — HOSPITAL ENCOUNTER (OUTPATIENT)
Dept: PHYSICAL THERAPY | Age: 62
Discharge: HOME | End: 2022-04-20
Payer: OTHER GOVERNMENT

## 2022-04-20 PROCEDURE — 97530 THERAPEUTIC ACTIVITIES: CPT

## 2022-04-20 PROCEDURE — 97140 MANUAL THERAPY 1/> REGIONS: CPT

## 2022-04-20 PROCEDURE — 97110 THERAPEUTIC EXERCISES: CPT

## 2022-04-20 NOTE — PROGRESS NOTES
PHYSICAL THERAPY - DAILY TREATMENT NOTE     Patient Name: Antony Goodman        Date: 2022  : 1960   YES Patient  Verified  Visit #:   14   of   15  Insurance: Payor:  / Plan: Facundo Benson 74 / Product Type:  /      In time: 059 Out time: 1020   Total Treatment Time: 65     Medicare/BCBS Time Tracking (below)   Total Timed Codes (min):   1:1 Treatment Time:       TREATMENT AREA =  Left shoulder pain [M25.512]    SUBJECTIVE    Pain Level (on 0 to 10 scale):  0-1  / 10   Medication Changes/New allergies or changes in medical history, any new surgeries or procedures? NO    If yes, update Summary List   Subjective Functional Status/Changes:  []  No changes reported       Functional improvements: \"I'm good.   I just want to be back to normal workouts\"  Functional impairments: Decreased ROM and strength affecting independence with reaching activities and ADL's         OBJECTIVE  Modalities Rationale:     decrease inflammation and decrease pain to improve patient's ability to perform ADL's w/o pain      min [] Estim, type/location:                                      []  att     []  unatt     []  w/US     []  w/ice    []  w/heat    min []  Mechanical Traction: type/lbs                   []  pro   []  sup   []  int   []  cont    []  before manual    []  after manual    min []  Ultrasound, settings/location:      min []  Iontophoresis w/ dexamethasone, location:                                               []  take home patch       []  in clinic   10 min [x]  Ice     []  Heat    location/position: Supine to L shoulder    min []  Vasopneumatic Device, press/temp:  If using vaso (only need to measure limb vaso being performed on)      pre-treatment girth :       post-treatment girth :       measured at (landmark location) :       min []  Other:    [x] Skin assessment post-treatment (if applicable):    [x]  intact    [x]  redness- no adverse reaction     []redness - adverse reaction: 30 min Therapeutic Exercise:  [x]  See flow sheet   Rationale:      increase ROM and increase strength to improve the patients ability to perform overhead activities w/o compensatory shoulder movement     15 min Therapeutic Activity: [x]  See flow sheet   Rationale:      increase ROM and increase strength to improve the patients ability to perform lifting/carrying light weight objects w/ ADL's     10 min Manual Therapy: Technique:      [] S/DTM []IASTM [x]PROM [x] Passive Stretching   []manual TPR    [x]Jt manipulation:Gr I [] II [x]  III [x] IV[x] V[]  Treatment Area:AP, inf glides followed by PROM all planes, contract relax for IR     Rationale:      decrease pain, increase ROM and increase tissue extensibility to improve patient's ability to perform reaching activities w/ full ROM. The manual therapy interventions were performed at a separate and distinct time from the therapeutic activities interventions. Billed With/As:   [x] TE   [x] TA   [] Neuro   [] Self Care Patient Education: [x] Review HEP    [] Progressed/Changed HEP based on:   [] positioning   [] body mechanics   [] transfers   [] heat/ice application    [] other:        Other Objective/Functional Measures:    added TB IR/ER and standing push up +     Post Treatment Pain Level (on 0 to 10) scale:   0  / 10     ASSESSMENT    Assessment/Changes in Function:     Patient requires min verbal cues to adjust resistance w/ exercises in order to perform pain free. Reminded to perform strengthening exercises at home. Good tolerance to new exercises w/ vc's; patient able to perform pain free w/ good scapular control. Continues to compensate with standing scaption and standing PNF; returned to supine PNF.      []  See Progress Note/Recertification   Patient will continue to benefit from skilled PT services to modify and progress therapeutic interventions, address functional mobility deficits, address ROM deficits, address strength deficits, analyze and address soft tissue restrictions, analyze and cue movement patterns, analyze and modify body mechanics/ergonomics and assess and modify postural abnormalities to attain remaining goals. Progress toward goals / Updated goals:    Progressing towards recently updated goals.      PLAN    [x]  Upgrade activities as tolerated YES Continue plan of care   []  Discharge due to :    []  Other:      Therapist: Ty Rivera PT    Date: 4/20/2022 Time: 8:05 AM     Future Appointments   Date Time Provider Jamila Vasquez   4/20/2022  9:15 AM Darlene Bello PT [facility]

## 2022-04-27 ENCOUNTER — HOSPITAL ENCOUNTER (OUTPATIENT)
Dept: PHYSICAL THERAPY | Age: 62
Discharge: HOME | End: 2022-04-27
Payer: OTHER GOVERNMENT

## 2022-04-27 PROCEDURE — 97140 MANUAL THERAPY 1/> REGIONS: CPT

## 2022-04-27 PROCEDURE — 97110 THERAPEUTIC EXERCISES: CPT

## 2022-04-27 PROCEDURE — 97530 THERAPEUTIC ACTIVITIES: CPT

## 2022-04-27 NOTE — PROGRESS NOTES
PHYSICAL THERAPY - DAILY TREATMENT NOTE     Patient Name: Roland Romero        Date: 2022  : 1960   YES Patient  Verified  Visit #:   15   of   15  Insurance: Payor:  / Plan: Facundo Benson 74 / Product Type:  /      In time: 1000 Out time: 6287   Total Treatment Time: 65     Medicare/BCBS Time Tracking (below)   Total Timed Codes (min):   1:1 Treatment Time:       TREATMENT AREA =  Left shoulder pain [M25.512]    SUBJECTIVE    Pain Level (on 0 to 10 scale):  0  / 10   Medication Changes/New allergies or changes in medical history, any new surgeries or procedures?     NO    If yes, update Summary List   Subjective Functional Status/Changes:  []  No changes reported       Functional improvements: \"I felt good after last session\"  Functional impairments: decreased strength and ROM affecting ADL's and lifitng         OBJECTIVE  Modalities Rationale:     decrease inflammation and decrease pain to improve patient's ability to perform ADL's w/o pain      min [] Estim, type/location:                                      []  att     []  unatt     []  w/US     []  w/ice    []  w/heat    min []  Mechanical Traction: type/lbs                   []  pro   []  sup   []  int   []  cont    []  before manual    []  after manual    min []  Ultrasound, settings/location:      min []  Iontophoresis w/ dexamethasone, location:                                               []  take home patch       []  in clinic   10 min [x]  Ice     []  Heat    location/position: Supine to left shoulder following treatment session    min []  Vasopneumatic Device, press/temp:  If using vaso (only need to measure limb vaso being performed on)      pre-treatment girth :       post-treatment girth :       measured at (landmark location) :       min []  Other:    [x] Skin assessment post-treatment (if applicable):    [x]  intact    [x]  redness- no adverse reaction     []redness - adverse reaction:      30 min Therapeutic Exercise:  [x]  See flow sheet   Rationale:      increase ROM and increase strength to improve the patients ability to perform overhead activities w/o difficulty     15 min Therapeutic Activity: [x]  See flow sheet   Rationale:      increase ROM, increase strength and improve coordination to improve the patients ability to perform dressing and grooming activities w/o pain or difficulty     10 min Manual Therapy: Technique:      [] S/DTM []IASTM [x]PROM [x] Passive Stretching   []manual TPR    []Jt manipulation:Gr I [] II []  III [x] IV[x] V[]  Treatment Area:ap/inf glides, PROM all planes     Rationale:      decrease pain, increase ROM and increase tissue extensibility to improve patient's ability to perform reaching within full ROM. The manual therapy interventions were performed at a separate and distinct time from the therapeutic activities interventions. Billed With/As:   [x] TE   [x] TA   [] Neuro   [] Self Care Patient Education: [x] Review HEP    [] Progressed/Changed HEP based on:   [] positioning   [] body mechanics   [] transfers   [] heat/ice application    [] other:        Other Objective/Functional Measures:    Progressed per flow sheet     Post Treatment Pain Level (on 0 to 10) scale:   0  / 10     ASSESSMENT    Assessment/Changes in Function:     Patient tolerated treatment session well. Notable fatigue w/ prone scapular strengthening exercises. Continues to have difficulty with PNF D2 in standing. []  See Progress Note/Recertification   Patient will continue to benefit from skilled PT services to modify and progress therapeutic interventions, address functional mobility deficits, address ROM deficits, address strength deficits, analyze and address soft tissue restrictions, analyze and cue movement patterns, analyze and modify body mechanics/ergonomics and assess and modify postural abnormalities to attain remaining goals.       Progress toward goals / Updated goals:    Good progress towards ROM and strengthening goals     PLAN    [x]  Upgrade activities as tolerated YES Continue plan of care   []  Discharge due to :    []  Other:      Therapist: Robin Arellano PT    Date: 4/27/2022 Time: 8:07 AM     Future Appointments   Date Time Provider Jamila Vasquez   4/27/2022 10:00 AM Collin Almanzar, PT [facility]   5/3/2022  8:30 AM Collin Almanzar, PT [facility]   5/5/2022  8:30 AM Collin Almanzar, PT [facility]   5/9/2022  8:30 AM Collin Almanzar, PT [facility]   5/11/2022  9:15 AM Collin Almanzar, PT [facility]

## 2022-05-03 ENCOUNTER — HOSPITAL ENCOUNTER (OUTPATIENT)
Dept: PHYSICAL THERAPY | Age: 62
Discharge: HOME | End: 2022-05-03
Payer: OTHER GOVERNMENT

## 2022-05-03 PROCEDURE — 97140 MANUAL THERAPY 1/> REGIONS: CPT

## 2022-05-03 PROCEDURE — 97110 THERAPEUTIC EXERCISES: CPT

## 2022-05-03 PROCEDURE — 97530 THERAPEUTIC ACTIVITIES: CPT

## 2022-05-03 NOTE — PROGRESS NOTES
PHYSICAL THERAPY - DAILY TREATMENT NOTE    Patient Name: Stephanie Jimenez        Date: 5/3/2022  : 1960   YES Patient  Verified  Visit #:     Insurance: Payor: DORINDA / Plan: Facundo Benson 74 / Product Type:  /      In time: 800 Out time: 905   Total Treatment Time: 65     BCBS/Medicare Time Tracking (below)   Total Timed Codes (min):  NA 1:1 Treatment Time:  NA     TREATMENT AREA =  Left shoulder pain [M25.512]    SUBJECTIVE  Pain Level (on 0 to 10 scale):  0  / 10   Medication Changes/New allergies or changes in medical history, any new surgeries or procedures? NO    If yes, update Summary List   Subjective Functional Status/Changes:  []  No changes reported     I have no pain but my motion is not improving much despite stretching. Its like I hit a hard wall. Modalities Rationale:     decrease edema, decrease inflammation and decrease pain to improve patient's ability to perform pain-free ADLs.     min [] Estim, type/location:                                      []  att     []  unatt     []  w/US     []  w/ice    []  w/heat    min []  Mechanical Traction: type/lbs                   []  pro   []  sup   []  int   []  cont    []  before manual    []  after manual    min []  Ultrasound, settings/location:      min []  Iontophoresis w/ dexamethasone, location:                                               []  take home patch       []  in clinic   10 min [x]  Ice     []  Heat    location/position: L shoulder supine    min []  Vasopneumatic Device, press/temp:    If using vaso (only need to measure limb vaso being performed on)      pre-treatment girth :       post-treatment girth :       measured at (landmark location) :      min []  Other:    [x] Skin assessment post-treatment (if applicable):    [x]  intact    [x]  redness- no adverse reaction                  []redness - adverse reaction:        30 min Therapeutic Exercise:  [x]  See flow sheet   Rationale:      increase ROM, increase strength, improve coordination and increase proprioception to improve the patients ability to perform unlimited ADLs. 10 min Manual Therapy: Grade 3 AP mobs of GHJ, PROM and stretching into all planes   Rationale:      decrease pain, increase ROM, increase tissue extensibility and decrease edema  to improve patient's ability to improve mobility required for ADLs. The manual therapy interventions were performed at a separate and distinct time from the therapeutic activities interventions. 15 min Therapeutic Activity: [x]  See flow sheet   Rationale:    increase ROM, increase strength, improve coordination and increase proprioception to improve the patients ability to resume reaching and grasping demands. Billed With/As:   [] TE   [] TA   [] Neuro   [] Self Care Patient Education: [x] Review HEP    [] Progressed/Changed HEP based on:   [] positioning   [] body mechanics   [] transfers   [] heat/ice application    [] other:      Other Objective/Functional Measures: Added FR on wall with YTB resistance at wrists    Performed PNF flexion supine    Added 2# weight to prone row     Post Treatment Pain Level (on 0 to 10) scale:   0  / 10     ASSESSMENT  Assessment/Changes in Function:     Improved tolerance to ER based exercises with minimal reports of popping/clicking during OH motion. Min cues required throughout for proper scapular placement; significant fatigue reported and visible juddering noted with all scapular based exercises. []  See Progress Note/Recertification   Patient will continue to benefit from skilled PT services to modify and progress therapeutic interventions, address functional mobility deficits, address ROM deficits, address strength deficits, analyze and address soft tissue restrictions, analyze and cue movement patterns, analyze and modify body mechanics/ergonomics and assess and modify postural abnormalities to attain remaining goals.    Progress toward goals / Updated goals:    Progressing towards LTG 3.       PLAN  [x]  Upgrade activities as tolerated YES Continue plan of care   []  Discharge due to :    []  Other:      Therapist: Demetrio Guo DPT    Date: 5/3/2022 Time: 8:59 AM     Future Appointments   Date Time Provider Jamila Vasquez   5/5/2022  8:30 AM Helena Alvarenga, PT [facility]   5/9/2022  8:30 AM Helena Alvarenga PT [facility]   5/11/2022  9:15 AM Helena Alvarenga, PT [facility]

## 2022-05-05 ENCOUNTER — HOSPITAL ENCOUNTER (OUTPATIENT)
Dept: PHYSICAL THERAPY | Age: 62
Discharge: HOME | End: 2022-05-05
Payer: OTHER GOVERNMENT

## 2022-05-05 PROCEDURE — 97110 THERAPEUTIC EXERCISES: CPT

## 2022-05-05 PROCEDURE — 97112 NEUROMUSCULAR REEDUCATION: CPT

## 2022-05-05 PROCEDURE — 97140 MANUAL THERAPY 1/> REGIONS: CPT

## 2022-05-05 PROCEDURE — 97530 THERAPEUTIC ACTIVITIES: CPT

## 2022-05-05 NOTE — PROGRESS NOTES
PHYSICAL THERAPY - DAILY TREATMENT NOTE     Patient Name: Ferdinand Luna        Date: 2022  : 1960   YES Patient  Verified  Visit #:     Insurance: Payor: DORINDA / Plan: Flory Mendez / Product Type:  /      In time: 260 Out time: 821   Total Treatment Time: 65     Medicare/[employer] Time Tracking (below)   Total Timed Codes (min):   1:1 Treatment Time:       TREATMENT AREA =  Left shoulder pain [M25.512]    SUBJECTIVE    Pain Level (on 0 to 10 scale):  0  / 10   Medication Changes/New allergies or changes in medical history, any new surgeries or procedures? NO    If yes, update Summary List   Subjective Functional Status/Changes:  []  No changes reported       Functional improvements: \"It's ok.   I am able to sleep on the left shoulder but I still can't quite reach behind me and once I get to about 90 I have trouble getting to the back corner\"  Functional impairments: Decreased AROM and strength affecting ADL's         OBJECTIVE  Modalities Rationale:     decrease inflammation and decrease pain to improve patient's ability to perform ADL's w/o pain      min [] Estim, type/location:                                      []  att     []  unatt     []  w/US     []  w/ice    []  w/heat    min []  Mechanical Traction: type/lbs                   []  pro   []  sup   []  int   []  cont    []  before manual    []  after manual    min []  Ultrasound, settings/location:      min []  Iontophoresis w/ dexamethasone, location:                                               []  take home patch       []  in clinic   10 min [x]  Ice     []  Heat    location/position: Supine to left shoulder following treatment session    min []  Vasopneumatic Device, press/temp:  If using vaso (only need to measure limb vaso being performed on)      pre-treatment girth :       post-treatment girth :       measured at (landmark location) :       min []  Other:    [x] Skin assessment post-treatment (if applicable): [x]  intact    [x]  redness- no adverse reaction     []redness - adverse reaction:      20 min Therapeutic Exercise:  [x]  See flow sheet   Rationale:      increase ROM and increase strength to improve the patients ability to reach overhead with light object     15 min Therapeutic Activity: [x]  See flow sheet   Rationale:      increase ROM, increase strength and improve coordination to improve the patients ability to perform dressing and grooming tasks     10 min Neuromuscular Re-ed: [x]  See flow sheet   Rationale:      increase strength, improve coordination and increase proprioception to improve the patients ability to perform overhead activities with proper scapular control and mobility     10 min Manual Therapy: Technique:      [] S/DTM []IASTM [x]PROM [x] Passive Stretching   []manual TPR    []Jt manipulation:Gr I [] II [x]  III [x] IV[x] V[]  Treatment Area:  AP/inf glides followed by PROM all planes   Rationale:      decrease pain, increase ROM and increase tissue extensibility to improve patient's ability to reach overhead. The manual therapy interventions were performed at a separate and distinct time from the therapeutic activities interventions. Billed With/As:   [] TE   [] TA   [] Neuro   [] Self Care Patient Education: [x] Review HEP    [] Progressed/Changed HEP based on:   [] positioning   [] body mechanics   [] transfers   [] heat/ice application    [] other:        Other Objective/Functional Measures:    Progressed per flow sheet     Post Treatment Pain Level (on 0 to 10) scale:   0  / 10     ASSESSMENT    Assessment/Changes in Function:     Patient making slow steady progress in strength.   Continues to lack strength in diagonal patterns and scaption above 90 deg     []  See Progress Note/Recertification   Patient will continue to benefit from skilled PT services to modify and progress therapeutic interventions, address functional mobility deficits, address ROM deficits, address strength deficits, analyze and address soft tissue restrictions, analyze and cue movement patterns, analyze and modify body mechanics/ergonomics and assess and modify postural abnormalities to attain remaining goals.       Progress toward goals / Updated goals:    Progressing towards LTG's     PLAN    [x]  Upgrade activities as tolerated YES Continue plan of care   []  Discharge due to :    []  Other:      Therapist: Robin Arellano PT    Date: 5/5/2022 Time: 7:58 AM     Future Appointments   Date Time Provider Jamila Vasquez   5/5/2022  8:30 AM Collin Almanzar, PT [facility]   5/9/2022  8:30 AM Collin Almanzar, PT [facility]   5/11/2022  9:15 AM Collin Almanzar, PT [facility]

## 2022-05-09 ENCOUNTER — HOSPITAL ENCOUNTER (OUTPATIENT)
Dept: PHYSICAL THERAPY | Age: 62
Discharge: HOME | End: 2022-05-09
Payer: OTHER GOVERNMENT

## 2022-05-09 PROCEDURE — 97530 THERAPEUTIC ACTIVITIES: CPT

## 2022-05-09 PROCEDURE — 97140 MANUAL THERAPY 1/> REGIONS: CPT

## 2022-05-09 PROCEDURE — 97110 THERAPEUTIC EXERCISES: CPT

## 2022-05-10 NOTE — PROGRESS NOTES
PHYSICAL THERAPY - DAILY TREATMENT NOTE    Patient Name: Jyothi Fall        Date: 2022  : 1960   YES Patient  Verified  Visit #:     Insurance: Payor:  / Plan: Facundo Benson 74 / Product Type:  /      In time: 900 Out time: 1005   Total Treatment Time: 60     BCBS/Medicare Time Tracking (below)   Total Timed Codes (min):   1:1 Treatment Time:       TREATMENT AREA =  Left shoulder pain [M25.512]    SUBJECTIVE  Pain Level (on 0 to 10 scale):  0  / 10   Medication Changes/New allergies or changes in medical history, any new surgeries or procedures? NO    If yes, update Summary List   Subjective Functional Status/Changes:  []  No changes reported     Still have tightness reaching up and behind my back.  No issues with the strengthening exercises          Modalities Rationale:     decrease inflammation, decrease pain and increase tissue extensibility to improve patient's ability to perform ADLs   min [] Estim, type/location:                                     []  att     []  unatt     []  w/US     []  w/ice    []  w/heat    min []  Mechanical Traction: type/lbs                   []  pro   []  sup   []  int   []  cont    []  before manual    []  after manual    min []  Ultrasound, settings/location:      min []  Iontophoresis w/ dexamethasone, location:                                               []  take home patch       []  in clinic   10 min [x]  Ice     []  Heat    location/position: L shoulder in supine    min []  Vasopneumatic Device, press/temp: If using vaso (only need to measure limb vaso being performed on)      pre-treatment girth :       post-treatment girth :       measured at (landmark location) :      min []  Other:    [x] Skin assessment post-treatment (if applicable):    [x]  intact    [x]  redness- no adverse reaction     []redness - adverse reaction:        20 min Therapeutic Exercise:  [x]  See flow sheet   Rationale:      increase ROM and increase strength to improve the patients ability to perform unlimted ADLs     15 min Manual Therapy: Technique:      [] S/DTM []IASTM [x]PROM  [] Passive Stretching  []manual TPR  [x]Jt manipulation:Gr I [] II []  III [x] IV[x] V[]  Treatment/Area:  GHJ inf glides in neutral and 80 degrees abd, GHJ post glides in horz add, stabilization to lateral and superior scap border during post cap stretch with ER   Rationale:      decrease pain, increase ROM, increase tissue extensibility and decrease trigger points to improve patient's ability to reach and dress  The manual therapy interventions were performed at a separate and distinct time from the therapeutic activities interventions. 15 min Therapeutic Activity: [x]  See flow sheet   Rationale:    increase ROM, increase strength and improve coordination to improve the patients ability to reach, carry and pull with correct mechanics    Billed With/As:   [x] TE   [x] TA   [] Neuro   [] Self Care Patient Education: [x] Review HEP    [] Progressed/Changed HEP based on:   [] positioning   [] body mechanics   [] transfers   [] heat/ice application    [x] other: Issued written HEP and reviewed     Other Objective/Functional Measures:    See FS, added post cap stretch with ER, sleeper stretch 3 way, standing dowel ext and IR stretch     Post Treatment Pain Level (on 0 to 10) scale:   0  / 10     ASSESSMENT  Assessment/Changes in Function:     Cueing needed to correct thoracic flexion collapse when reaching into fxnl IR and OH.  Good tolerance to addition of capsular stretches     []  See Progress Note/Recertification   Patient will continue to benefit from skilled PT services to modify and progress therapeutic interventions, address functional mobility deficits, address ROM deficits, address strength deficits, analyze and address soft tissue restrictions, analyze and cue movement patterns, analyze and modify body mechanics/ergonomics, assess and modify postural abnormalities, address imbalance/dizziness and instruct in home and community integration to attain remaining goals.    Progress toward goals / Updated goals:    Partially met LTG1, plan to finalize for DC NV     PLAN  [x]  Upgrade activities as tolerated YES Continue plan of care   []  Discharge due to :    []  Other:      Therapist: Lissa Rodriguez, PT, DPT, MTC, CMTPT    Date: 5/9/2022 Time: 8:50 PM     Future Appointments   Date Time Provider Jamila Vasquez   5/11/2022  9:15 AM Loyd Jauregui PT [facility]

## 2022-05-11 ENCOUNTER — HOSPITAL ENCOUNTER (OUTPATIENT)
Dept: PHYSICAL THERAPY | Age: 62
Discharge: HOME | End: 2022-05-11
Payer: OTHER GOVERNMENT

## 2022-05-11 PROCEDURE — 97530 THERAPEUTIC ACTIVITIES: CPT

## 2022-05-11 PROCEDURE — 97140 MANUAL THERAPY 1/> REGIONS: CPT

## 2022-05-11 PROCEDURE — 97110 THERAPEUTIC EXERCISES: CPT

## 2022-05-11 NOTE — PROGRESS NOTES
[facility] AT [address] [place] [address] Donald Plass [address], [address] [place] [address], [address]  Phone: [phone]  Fax: [phone] SUMMARY FOR PHYSICAL THERAPY          Patient Name: Frank Gonzalez : 1960   Treatment/Medical Diagnosis: Left shoulder pain [M25.512]   Onset Date: 22    Referral Source: Karla Sharp MD [facility]): 22   Prior Hospitalization: See Medical History Provider #: [id number]   Prior Level of Function: Independent all ADL's   Comorbidities: TB, Arthritis, Alcohol use, Skin Cancer, C7-C3 fusion   Medications: Verified on Patient Summary List   Visits from [facility]: 19 Missed Visits: 0     Previous Goals:  1) Patient to be independent & compliant with progressive HEP in preparation for D/C.  2) Patient to demonstrate full AROM in all planes to allow for return to PLF  3.) Patient to increase FOTO to >/= to 66 indicating improved functional mobility and independence   Prior Level/Current Level:  1) Prior Level: Ongoing   Current Level: Independent w/ current HEP, gym program and progression   . Goal Met? yes  2) Prior Level: Flex:100/160, Scaption: 70/137, ER: C6/55, IR:L3/57   Current Level: Flex:130/170, Scaption:170 @ 130 flex/170, ER: C8/90, IR:T11/60   Goal Met? MET partially in some motions and functional use in other planes  3) Prior Level: 39/100   Current Level: 69/100   Goal Met? yes    Key Functional Changes/Progress: Mr. Pete Waters has made good gains in his physical therapy consistently reporting improved ROM, strength, and functional independence. He has regained most of his ROM and is independent with all functional mobility. He is lacking strength at end range but is independent in his HEP and post surgical precautions. Assessments/Recommendations: Discontinue therapy. Progressing towards or have reached established goals. If you have any questions/comments please contact us directly at [phone].    Thank you for allowing us to assist in the care of your patient.     Therapist Signature: Jose Kidd, PT Date: 5/11/22   Reporting Period: n/a Time: 8:09 AM

## 2022-05-11 NOTE — PROGRESS NOTES
PHYSICAL THERAPY - DAILY TREATMENT NOTE     Patient Name: Natalie Amezquita        Date: 2022  : 1960   YES Patient  Verified  Visit #:     Insurance: Payor:  / Plan: Facundo Benson 74 / Product Type:  /      In time: 512 Out time: 1005   Total Treatment Time: 50     Medicare/BCBS Time Tracking (below)   Total Timed Codes (min):   1:1 Treatment Time:       TREATMENT AREA =  Left shoulder pain [M25.512]    SUBJECTIVE    Pain Level (on 0 to 10 scale):  0  / 10   Medication Changes/New allergies or changes in medical history, any new surgeries or procedures?     NO    If yes, update Summary List   Subjective Functional Status/Changes:  []  No changes reported       Functional improvements: \"I'm doing good\"           OBJECTIVE  Modalities Rationale:     decrease inflammation and decrease pain to improve patient's ability to perform ADL's w/o pain      min [] Estim, type/location:                                      []  att     []  unatt     []  w/US     []  w/ice    []  w/heat    min []  Mechanical Traction: type/lbs                   []  pro   []  sup   []  int   []  cont    []  before manual    []  after manual    min []  Ultrasound, settings/location:      min []  Iontophoresis w/ dexamethasone, location:                                               []  take home patch       []  in clinic   10 min [x]  Ice     []  Heat    location/position: Supine to L shoulder    min []  Vasopneumatic Device, press/temp:  If using vaso (only need to measure limb vaso being performed on)      pre-treatment girth :       post-treatment girth :       measured at (landmark location) :       min []  Other:    [x] Skin assessment post-treatment (if applicable):    [x]  intact    [x]  redness- no adverse reaction     []redness - adverse reaction:      20 min Therapeutic Exercise:  [x]  See flow sheet   Rationale:      increase ROM and increase strength to improve the patients ability to reach overhead     15 min Therapeutic Activity: [x]  See flow sheet   Rationale:      increase ROM and increase strength to improve the patients ability to perform ADL's and grooming tasks     15 min Manual Therapy: Technique:      [] S/DTM []IASTM []PROM [] Passive Stretching   []manual TPR    []Jt manipulation:Gr I [] II []  III [] IV[] V[]  Treatment Area:     Rationale:      decrease pain, increase ROM and increase tissue extensibility to improve patient's ability to perform all tasks overhead with full ROM. The manual therapy interventions were performed at a separate and distinct time from the therapeutic activities interventions. Billed With/As:   [x] TE   [x] TA   [] Neuro   [] Self Care Patient Education: [x] Review HEP    [] Progressed/Changed HEP based on:   [] positioning   [] body mechanics   [] transfers   [] heat/ice application    [] other:        Other Objective/Functional Measures:    See D/C summary     Post Treatment Pain Level (on 0 to 10) scale:   0  / 10     ASSESSMENT    Assessment/Changes in Function:     See D/C Summary     []  See Progress Note/Recertification   Patient will continue to benefit from skilled PT services to modify and progress therapeutic interventions, address functional mobility deficits, address ROM deficits, address strength deficits, analyze and address soft tissue restrictions, analyze and cue movement patterns, analyze and modify body mechanics/ergonomics and assess and modify postural abnormalities to attain remaining goals. Progress toward goals / Updated goals:    See D/C Summary     PLAN    []  Upgrade activities as tolerated NO Continue plan of care   [x]  Discharge due to :  All Goals met   []  Other:      Therapist: Lory Quick PT    Date: 5/11/2022 Time: 8:07 AM     Future Appointments   Date Time Provider Jaimla Vasquez   5/11/2022  9:15 AM Jesusita Christianson, [facility]

## 2022-08-10 ENCOUNTER — HOSPITAL ENCOUNTER (OUTPATIENT)
Dept: PHYSICAL THERAPY | Age: 62
Discharge: HOME | End: 2022-08-10
Payer: OTHER GOVERNMENT

## 2022-08-10 PROCEDURE — 97162 PT EVAL MOD COMPLEX 30 MIN: CPT

## 2022-08-10 PROCEDURE — 97110 THERAPEUTIC EXERCISES: CPT

## 2022-08-10 PROCEDURE — 97140 MANUAL THERAPY 1/> REGIONS: CPT

## 2022-08-10 NOTE — PROGRESS NOTES
PHYSICAL THERAPY - DAILY TREATMENT NOTE    Patient Name: Davidson Barnhart        Date: 8/10/2022  : 1960   YES Patient  Verified  Visit #:      12  Insurance: Payor: DORINDA / Plan: Facundo Benson 74 / Product Type:  /      In time: 930 Out time: 1025   Total Treatment Time: 55     BCBS/Medicare Time Tracking (below)   Total Timed Codes (min):  NA 1:1 Treatment Time:  NA     TREATMENT AREA =  Pain in right upper arm [M79.621]    SUBJECTIVE  Pain Level (on 0 to 10 scale):  2-5  / 10   Medication Changes/New allergies or changes in medical history, any new surgeries or procedures? NO    If yes, update Summary List   Subjective Functional Status/Changes:  []  No changes reported     Patient is a 57 y.o. male who presents to [facility] s/p  R distal bicep reattachment on 2022. Modalities Rationale:     decrease edema, decrease inflammation, and decrease pain to improve patient's ability to perform pain-free ADLs.     min [] Estim, type/location:                                      []  att     []  unatt     []  w/US     []  w/ice    []  w/heat    min []  Mechanical Traction: type/lbs                   []  pro   []  sup   []  int   []  cont    []  before manual    []  after manual    min []  Ultrasound, settings/location:      min []  Iontophoresis w/ dexamethasone, location:                                               []  take home patch       []  in clinic   10 min [x]  Ice     []  Heat    location/position: R elbow supine    min []  Vasopneumatic Device, press/temp:    If using vaso (only need to measure limb vaso being performed on)      pre-treatment girth :       post-treatment girth :       measured at (landmark location) :      min []  Other:    [x] Skin assessment post-treatment (if applicable):    [x]  intact    [x]  redness- no adverse reaction                  []redness - adverse reaction:        15 min Therapeutic Exercise:  [x]  See flow sheet Rationale:      increase ROM, increase strength, and improve coordination to improve the patients ability to perform unlimited ADLs. 10 min Manual Therapy: PROM of elbow and wrist with gentle stretching at end range. Rationale:      decrease pain, increase ROM, and increase tissue extensibility to improve patient's ability to improve motion per protocol  The manual therapy interventions were performed at a separate and distinct time from the therapeutic activities interventions. Billed With/As:   [x] TE   [] TA   [] Neuro   [] Self Care Patient Education: [x] Review HEP    [] Progressed/Changed HEP based on:   [] positioning   [] body mechanics   [] transfers   [] heat/ice application    [] other:      Other Objective/Functional Measures:    FOTO 43     Post Treatment Pain Level (on 0 to 10) scale:   2  / 10     ASSESSMENT  Assessment/Changes in Function:     See POC     []  See Progress Note/Recertification   Patient will continue to benefit from skilled PT services to modify and progress therapeutic interventions, address functional mobility deficits, address ROM deficits, address strength deficits, analyze and address soft tissue restrictions, analyze and cue movement patterns, analyze and modify body mechanics/ergonomics, and assess and modify postural abnormalities to attain remaining goals. Progress toward goals / Updated goals:    See POC for new short and long term goals.       PLAN  [x]  Upgrade activities as tolerated YES Continue plan of care   []  Discharge due to :    []  Other:      Therapist: Jarrod Jimenez DPT    Date: 8/10/2022 Time: 2:56 PM     Future Appointments   Date Time Provider Jamila Vasquez   8/19/2022 10:00 AM Nova Klein, PT [facility]   8/23/2022  8:00 AM Richardine Primrose, PT [facility]   8/25/2022  8:45 AM Richardine Primrose, PT [facility]   8/29/2022  8:45 AM Richardine Primrose, PT [facility]   8/31/2022  8:45 AM Aurelia Pizano, PT [facility]   9/6/2022 8:45 AM Kristine Ace, PT [facility] [address]   9/8/2022  8:45 AM Davidora Ace, PT [facility] [address]   9/12/2022  8:45 AM Kristine Ace, PT [facility] [address]   9/14/2022  8:45 AM Sukumar Her, PT [facility] [place] [address]   9/19/2022  8:45 AM Kristine Gomez, PT [facility] [address]   9/21/2022  8:00 AM Sukumar Her, PT [facility] [address]

## 2022-08-10 NOTE — PROGRESS NOTES
[address] [place] [address] PHYSICAL THERAPY AT [address] [place] [address] Donald Plass [address], [address] ,[address], [address]  Phone: [phone]  Fax: [phone] / [address] [address] PHYSICAL THERAPY SERVICES  Patient Name: Jayant Bess : 1960   Medical   Diagnosis: No admission diagnoses are documented for this encounter. Treatment Diagnosis: S/p Distal bicep reattaches   Onset Date: DOS 2022     Referral Source: [facility]): 8/10/2022   Prior Hospitalization: See medical history Provider #: [id number]   Prior Level of Function: Pain-free ADLs, weight training and yardwork unlimited. Comorbidities: TB, Arthritis, Alcohol use, Cancer(Skin), C7-C3 fusion   Medications: Verified on Patient Summary List   The Plan of Care and following information is based on the information from the initial evaluation.   ===========================================================================================  Assessment / key information:  Patient is a 57 y.o. male who presents to [facility] s/p  R distal bicep reattachment on 2022. Patient reports initial injury 3 weeks prior to eval; reports feeling a pop when lifting a bed when his elbow caught against his knee and hyperextended. Pt reported bicep fully retracted up into anterior shoulder. Surgery was delayed due to positive COVID test; underwent reattachment surgery on 2022. Pt reports wearing sling for 1 week as advised by MD. He arrives today without sling reporting some discomfort and swelling along medial forearm near incision. Incision appears clean and healing well without any drainage present. Swelling is moderate with some noted into fingers; he Is having some tingling into fingers but reports symptoms decrease when elevating arm in the evening. Pt denies icing at home but is taking tylenol as needed for pain.  Elbow ROM today is  deg flex (0-140 L); shoulder ROM is limited in all directions but pt reports long history of B shoulder pain and limited motion >10 years. Forearm pronation with elbow bent approx 50 deg, elbow straight approx 20 deg. Passive supination 60 deg.  strength R 68.5 lb compared to 82 lb on L, Pt was educated extensively on post-op protocol and importance of maintaining PROM only at this phase of rehab. Patient sings and sx are consistent with pain/discomfort following R distal biceps reattachement. An initial HEP was provided to address above deficits. Physical Therapy services will be beneficial in order to decrease pain, improve ROM, strength and stability of R elbow and shoulder in order to resume recreational activity and return to Of.   ===========================================================================================  Eval Complexity: History MEDIUM  Complexity : 1-2 comorbidities / personal factors will impact the outcome/ POC ;  Examination  MEDIUM Complexity : 3 Standardized tests and measures addressing body structure, function, activity limitation and / or participation in recreation ; Presentation MEDIUM Complexity : Evolving with changing characteristics ;   Decision Making MEDIUM Complexity : FOTO score of 26-74; Overall Complexity MEDIUM  Problem List: pain affecting function, decrease ROM, decrease strength, edema affecting function, impaired gait/ balance, decrease ADL/ functional abilitiies, decrease activity tolerance, decrease flexibility/ joint mobility, and decrease transfer abilities   Treatment Plan may include any combination of the following: Therapeutic exercise, Therapeutic activities, Neuromuscular re-education, Physical agent/modality, Manual therapy, Patient education, Self Care training, Functional mobility training, and Home safety training  Patient / Family readiness to learn indicated by: asking questions, trying to perform skills, and interest  Persons(s) to be included in education: patient (P)  Barriers to Learning/Limitations: None  Measures taken, if barriers to learning:    Patient Goal (s): \"Movement\"   Patient self reported health status: good  Rehabilitation Potential: good  Short Term Goals: To be accomplished in  3  weeks:  Patient will be independent and compliant with initial HEP. Patient will report decreased pain levels to 0/10 in order to sleep through the night pain-free. Demonstrate improved R elbow PROM to 0-140 deg to equal L and improve dressing abilities. Long Term Goals: To be accomplished in  6  weeks:  Patient will be independent and compliant with progressive HEP for RUE strength/stability in order to maintain gains made with physical therapy  Improve FOTO score from 43 to > or = 70 in order to indicate improved ease with ADLs. Demonstrate improved R shoulder strength to 4+/5 to improve patients ability to improve stability of RUE required for recreational activity. Frequency / Duration:   Patient to be seen  2  times per week for 6-8  weeks:  Patient / Caregiver education and instruction: self care, activity modification, brace/ splint application, and exercises  Therapist Signature: Ryan Gonzalez DPT Date: 7/07/6724   Certification Period: NA Time: 9:33 AM   ===========================================================================================  I certify that the above Physical Therapy Services are being furnished while the patient is under my care. I agree with the treatment plan and certify that this therapy is necessary. Physician Signature:        Date:       Time:     Please sign and return to [facility] or you may fax the signed copy to [phone]. Thank you.

## 2022-08-19 ENCOUNTER — HOSPITAL ENCOUNTER (OUTPATIENT)
Dept: PHYSICAL THERAPY | Age: 62
Discharge: HOME | End: 2022-08-19
Payer: OTHER GOVERNMENT

## 2022-08-19 PROCEDURE — 97140 MANUAL THERAPY 1/> REGIONS: CPT

## 2022-08-19 PROCEDURE — 97110 THERAPEUTIC EXERCISES: CPT

## 2022-08-19 PROCEDURE — 97530 THERAPEUTIC ACTIVITIES: CPT

## 2022-08-19 NOTE — PROGRESS NOTES
PHYSICAL THERAPY - DAILY TREATMENT NOTE     Patient Name: Dang Donaldson        Date: 2022  : 1960   YES Patient  Verified  Visit #:     Insurance: Payor: DORINDA / Plan: Facundo Benson 74 / Product Type:  /      In time: 1000 Out time: 1050   Total Treatment Time: 50     Medicare/[facility] Time Tracking (below)   Total Timed Codes (min):   1:1 Treatment Time:       TREATMENT AREA =  No admission diagnoses are documented for this encounter. SUBJECTIVE    Pain Level (on 0 to 10 scale):  2  / 10   Medication Changes/New allergies or changes in medical history, any new surgeries or procedures? NO    If yes, update Summary List   Subjective Functional Status/Changes:  []  No changes reported       Functional improvements: \"It's better.   It still gets swollen and the bone pain is the worst part\"  Functional impairments: Decreased ROM and strength, increased pain affecting all ADL's         OBJECTIVE  Modalities Rationale:     decrease edema, decrease inflammation, and decrease pain to improve patient's ability to perform ADL's w/o pain      min [] Estim, type/location:                                      []  att     []  unatt     []  w/US     []  w/ice    []  w/heat    min []  Mechanical Traction: type/lbs                   []  pro   []  sup   []  int   []  cont    []  before manual    []  after manual    min []  Ultrasound, settings/location:      min []  Iontophoresis w/ dexamethasone, location:                                               []  take home patch       []  in clinic   10 min [x]  Ice     []  Heat    location/position: Supine to R elbow following treatment    min []  Vasopneumatic Device, press/temp:  If using vaso (only need to measure limb vaso being performed on)      pre-treatment girth :       post-treatment girth :       measured at (landmark location) :       min []  Other:    [x] Skin assessment post-treatment (if applicable):    [x]  intact    [x]  redness- no adverse reaction     []redness - adverse reaction:      15 min Therapeutic Exercise:  [x]  See flow sheet   Rationale:      increase ROM and increase strength to improve the patients ability to reach overhead with ADL's     10 min Therapeutic Activity: [x]  See flow sheet   Rationale:      increase ROM and increase strength to improve the patients ability to perform ADL's     15 min Manual Therapy: Technique:      [x] S/DTM []IASTM [x]PROM [x] Passive Stretching   []manual TPR    []Jt manipulation:Gr I [] II []  III [] IV[] V[]  Treatment Area:scar mobilization, PROM all planes R elbow and wrist    Rationale:      decrease pain, increase ROM, and increase tissue extensibility to improve patient's ability to perform ADL's. The manual therapy interventions were performed at a separate and distinct time from the therapeutic activities interventions. Other Objective/Functional Measures:    Initiated therex per flow sheet  AAROM: 0-125 R elbow     Post Treatment Pain Level (on 0 to 10) scale:   0  / 10     ASSESSMENT    Assessment/Changes in Function:     Patient tolerated treatment session well. Notable tightness in supination and wrist extension. Improvement noted in elbow ROM compared to initial eval.     []  See Progress Note/Recertification   Patient will continue to benefit from skilled PT services to modify and progress therapeutic interventions, address functional mobility deficits, address ROM deficits, address strength deficits, analyze and address soft tissue restrictions, analyze and cue movement patterns, analyze and modify body mechanics/ergonomics, and assess and modify postural abnormalities to attain remaining goals.       Progress toward goals / Updated goals:    Progressing towards newly established goals     PLAN    [x]  Upgrade activities as tolerated YES Continue plan of care   []  Discharge due to :    []  Other:      Therapist: Cristino Monk, PT    Date: 8/19/2022 Time: 8:56 AM     Future Appointments   Date Time Provider Jamila Vasquez   8/19/2022 10:00 AM Jignesh Tracey, PT [facility]   8/23/2022  8:00 AM Margaret Pruitt, PT [facility]   8/25/2022  8:45 AM Margarte Pruitt, PT [facility]   8/29/2022  8:45 AM Margaret Pruitt, PT [facility]   8/31/2022  8:45 AM See Giang, PT [facility]   9/6/2022  8:45 AM Margaret Pruitt, PT [facility]   9/8/2022  8:45 AM Margaret Pruitt, PT [facility]   9/12/2022  8:45 AM Margaret Pruitt, PT [facility]   9/14/2022  8:45 AM See Giang, PT [facility]   9/19/2022  8:45 AM Margaret Pruitt, PT [facility]   9/21/2022  8:00 AM See Giang, PT [facility]

## 2022-08-23 ENCOUNTER — HOSPITAL ENCOUNTER (OUTPATIENT)
Dept: PHYSICAL THERAPY | Age: 62
Discharge: HOME | End: 2022-08-23
Payer: OTHER GOVERNMENT

## 2022-08-23 PROCEDURE — 97110 THERAPEUTIC EXERCISES: CPT

## 2022-08-23 PROCEDURE — 97140 MANUAL THERAPY 1/> REGIONS: CPT

## 2022-08-23 PROCEDURE — 97530 THERAPEUTIC ACTIVITIES: CPT

## 2022-08-23 NOTE — PROGRESS NOTES
PHYSICAL THERAPY - DAILY TREATMENT NOTE    Patient Name: Dagoberto Ascencio        Date: 2022  : 1960   YES Patient  Verified  Visit #:   3   of   12  Insurance: Payor:  / Plan: Facundo Benson 74 / Product Type:  /      In time: 8:00 A Out time: 8:55 A   Total Treatment Time: 50     BCBS/Medicare Time Tracking (below)   Total Timed Codes (min):  NA 1:1 Treatment Time:  NA     TREATMENT AREA =  Pain in right upper arm [M79.621]    SUBJECTIVE  Pain Level (on 0 to 10 scale):  1  / 10   Medication Changes/New allergies or changes in medical history, any new surgeries or procedures? NO    If yes, update Summary List   Subjective Functional Status/Changes:  []  No changes reported     Patient reports on occasion (~every other day) he will supinate his arm when he is asleep & feels a sharp pain, he knows he is not supposed to do this motion.             Modalities Rationale:     decrease edema and decrease inflammation to improve patient's ability to return to pain-free reaching using R UE    min [] Estim, type/location:                                      []  att     []  unatt     []  w/US     []  w/ice    []  w/heat    min []  Mechanical Traction: type/lbs                   []  pro   []  sup   []  int   []  cont    []  before manual    []  after manual    min []  Ultrasound, settings/location:      min []  Iontophoresis w/ dexamethasone, location:                                               []  take home patch       []  in clinic   10 min [x]  Ice     []  Heat    location/position: Supine to R elbow    min []  Vasopneumatic Device, press/temp:    If using vaso (only need to measure limb vaso being performed on)      pre-treatment girth :       post-treatment girth :       measured at (landmark location) :      min []  Other:    [] Skin assessment post-treatment (if applicable):    [x]  intact    [x]  redness- no adverse reaction                  []redness - adverse reaction:        20 min Therapeutic Exercise:  [x]  See flow sheet   Rationale:      increase ROM and increase strength to improve the patients ability to return to pain-free reaching using R UE      10 min Therapeutic Activity: [x]  See flow sheet   Rationale:    increase ROM and increase strength to improve the patients ability to return to pain-free dressing      10 min Manual Therapy: Gentle passive elbow flex/extension, gentle passive ER of R shoulder in supine, scar mobilization   Rationale:      decrease pain, increase ROM, increase tissue extensibility, decrease trigger points, and increase postural awareness to improve patient's ability to return to pain-free   The manual therapy interventions were performed at a separate and distinct time from the therapeutic activities interventions. Billed With/As:   [] TE   [] TA   [] Neuro   [] Self Care Patient Education: [x] Review HEP    [] Progressed/Changed HEP based on:   [] positioning   [] body mechanics   [] transfers   [] heat/ice application    [] other:      Other Objective/Functional Measures:    See flow sheet      Post Treatment Pain Level (on 0 to 10) scale:   0  / 10     ASSESSMENT  Assessment/Changes in Function:     Reviewed activity precautions & restrictions with patient     []  See Progress Note/Recertification   Patient will continue to benefit from skilled PT services to modify and progress therapeutic interventions, address functional mobility deficits, address ROM deficits, address strength deficits, analyze and address soft tissue restrictions, analyze and cue movement patterns, analyze and modify body mechanics/ergonomics, assess and modify postural abnormalities, and instruct in home and community integration to attain remaining goals.    Progress toward goals / Updated goals:    Progressing towards STG 2 & 3     PLAN  []  Upgrade activities as tolerated YES Continue plan of care   []  Discharge due to :    []  Other:      Therapist: Vidal Velázquez, PT Date: 8/23/2022 Time: 8:21 AM     Future Appointments   Date Time Provider Jamila Vasquez   8/25/2022  8:45 AM Azalia Cisneros, PT [facility]   8/29/2022  8:45 AM Azalia Cisneros, PT [facility]   8/31/2022  8:45 AM Vandana Segundo, PT [facility]   9/6/2022  8:45 AM Azalia Cisneros, PT [facility]   9/8/2022  8:45 AM Azalia Cisneros, PT [facility]   9/12/2022  8:45 AM Azalia Cisneros, PT [facility]   9/14/2022  8:45 AM Vandana Segundo, PT [facility]   9/19/2022  8:45 AM Azalia Cisneros, PT [facility]   9/21/2022  8:00 AM Vandana Segundo, PT [facility]

## 2022-08-25 ENCOUNTER — HOSPITAL ENCOUNTER (OUTPATIENT)
Dept: PHYSICAL THERAPY | Age: 62
Discharge: HOME | End: 2022-08-25
Payer: OTHER GOVERNMENT

## 2022-08-25 PROCEDURE — 97140 MANUAL THERAPY 1/> REGIONS: CPT

## 2022-08-25 PROCEDURE — 97530 THERAPEUTIC ACTIVITIES: CPT

## 2022-08-25 PROCEDURE — 97110 THERAPEUTIC EXERCISES: CPT

## 2022-08-25 NOTE — PROGRESS NOTES
PHYSICAL THERAPY - DAILY TREATMENT NOTE    Patient Name: Sweta Gardner        Date: 2022  : 1960   YES Patient  Verified  Visit #:     Insurance: Payor:  / Plan: Facundo Benson 74 / Product Type:  /      In time: 8:45 A Out time: 9:40 A   Total Treatment Time: 50     BCBS/Medicare Time Tracking (below)   Total Timed Codes (min):  NA 1:1 Treatment Time:  NA     TREATMENT AREA =  Pain in right upper arm [M79.621]    SUBJECTIVE  Pain Level (on 0 to 10 scale):  1  / 10   Medication Changes/New allergies or changes in medical history, any new surgeries or procedures? NO    If yes, update Summary List   Subjective Functional Status/Changes:  []  No changes reported     Patient reports his follow up with MD is on 22, he did fine after last PT.           Modalities Rationale:     decrease edema and decrease inflammation to improve patient's ability to return to pain-free reaching using R UE    min [] Estim, type/location:                                      []  att     []  unatt     []  w/US     []  w/ice    []  w/heat    min []  Mechanical Traction: type/lbs                   []  pro   []  sup   []  int   []  cont    []  before manual    []  after manual    min []  Ultrasound, settings/location:      min []  Iontophoresis w/ dexamethasone, location:                                               []  take home patch       []  in clinic   10 min [x]  Ice     []  Heat    location/position: Supine to R elbow    min []  Vasopneumatic Device, press/temp:    If using vaso (only need to measure limb vaso being performed on)      pre-treatment girth :       post-treatment girth :       measured at (landmark location) :      min []  Other:    [] Skin assessment post-treatment (if applicable):    [x]  intact    [x]  redness- no adverse reaction                  []redness - adverse reaction:        20 min Therapeutic Exercise:  [x]  See flow sheet   Rationale:      increase ROM and increase strength to improve the patients ability to return to pain-free reaching using R UE      10 min Therapeutic Activity: [x]  See flow sheet   Rationale:    increase ROM and increase strength to improve the patients ability to return to pain-free rolling & bed mobility       15 min Manual Therapy: Gentle passive elbow flex/extension, gentle passive ER of R shoulder in supine, gentle scar mobilization   Rationale:      decrease pain, increase ROM, increase tissue extensibility, decrease trigger points, and increase postural awareness to improve patient's ability to return to pain-free use of R elbow with elevation   The manual therapy interventions were performed at a separate and distinct time from the therapeutic activities interventions. Billed With/As:   [] TE   [] TA   [] Neuro   [] Self Care Patient Education: [x] Review HEP    [] Progressed/Changed HEP based on:   [] positioning   [] body mechanics   [] transfers   [] heat/ice application    [] other:      Other Objective/Functional Measures: Added prone retraction with 0#   Post Treatment Pain Level (on 0 to 10) scale:   0  / 10     ASSESSMENT  Assessment/Changes in Function:   Good tolerance to initial pronelying & gentle scapular ROM/strengthening today      []  See Progress Note/Recertification   Patient will continue to benefit from skilled PT services to modify and progress therapeutic interventions, address functional mobility deficits, address ROM deficits, address strength deficits, analyze and address soft tissue restrictions, analyze and cue movement patterns, analyze and modify body mechanics/ergonomics, assess and modify postural abnormalities, and instruct in home and community integration to attain remaining goals.    Progress toward goals / Updated goals:    Progressing towards STG 2 & 3     PLAN  []  Upgrade activities as tolerated YES Continue plan of care   []  Discharge due to :    []  Other:      Therapist: Washington Estevez PT    Date: 8/25/2022 Time: 9:40 AM     Future Appointments   Date Time Provider Jamila Christina   8/29/2022  8:45 AM Héctor Alcala, PT [facility]   8/31/2022  8:45 AM Tay Martines, PT [facility]   9/6/2022  8:45 AM Héctor Alcala, PT [facility]   9/8/2022  8:45 AM Héctor Alcala, PT [facility]   9/12/2022  8:45 AM Héctor Alcala, PT [facility]   9/14/2022  8:45 AM Tay Martines, PT [facility]   9/19/2022  8:45 AM Héctor Alcala, PT [facility]   9/21/2022  8:00 AM Tay Martines, PT [facility]   9/26/2022  8:45 AM Héctor Alcala, PT [facility]   9/28/2022  8:30 AM Shane Crow, PT [facility]

## 2022-08-29 ENCOUNTER — HOSPITAL ENCOUNTER (OUTPATIENT)
Dept: PHYSICAL THERAPY | Age: 62
Discharge: HOME | End: 2022-08-29
Payer: OTHER GOVERNMENT

## 2022-08-29 PROCEDURE — 97140 MANUAL THERAPY 1/> REGIONS: CPT

## 2022-08-29 PROCEDURE — 97530 THERAPEUTIC ACTIVITIES: CPT

## 2022-08-29 PROCEDURE — 97110 THERAPEUTIC EXERCISES: CPT

## 2022-08-29 NOTE — PROGRESS NOTES
PHYSICAL THERAPY - DAILY TREATMENT NOTE    Patient Name: Porfirio Cota        Date: 2022  : 1960   YES Patient  Verified  Visit #:     Insurance: Payor: DORINDA / Plan: Facundo Benson 74 / Product Type:  /      In time: 8:45 A Out time: 9:40 A   Total Treatment Time: 50     BCBS/Medicare Time Tracking (below)   Total Timed Codes (min):  NA 1:1 Treatment Time:  NA     TREATMENT AREA =  Pain in right upper arm [M79.621]    SUBJECTIVE  Pain Level (on 0 to 10 scale):  1  / 10   Medication Changes/New allergies or changes in medical history, any new surgeries or procedures? NO    If yes, update Summary List   Subjective Functional Status/Changes:  []  No changes reported     Patient reports the aching in his bone is getting a little better, it is less intense & less frequent.             Modalities Rationale:     decrease edema, decrease inflammation, and decrease pain to improve patient's ability to return to pain-free ADLs    min [] Estim, type/location:                                      []  att     []  unatt     []  w/US     []  w/ice    []  w/heat    min []  Mechanical Traction: type/lbs                   []  pro   []  sup   []  int   []  cont    []  before manual    []  after manual    min []  Ultrasound, settings/location:      min []  Iontophoresis w/ dexamethasone, location:                                               []  take home patch       []  in clinic   10 min [x]  Ice     []  Heat    location/position: Supine to R elbow    min []  Vasopneumatic Device, press/temp:    If using vaso (only need to measure limb vaso being performed on)      pre-treatment girth :       post-treatment girth :       measured at (landmark location) :      min []  Other:    [] Skin assessment post-treatment (if applicable):    [x]  intact    [x]  redness- no adverse reaction                  []redness - adverse reaction:        15 min Therapeutic Exercise:  [x]  See flow sheet Rationale:      increase ROM and increase strength to improve the patients ability to return to light lifting using R UE once precautions are lifted      10 min Therapeutic Activity: [x]  See flow sheet   Rationale:    increase ROM and increase strength to improve the patients ability to return to pain-free rolling in S/L at night    15 min Manual Therapy: PROM for R shoulder flex, ER, passive elbow extension, oscillations to promote relaxation   Rationale:      decrease pain, increase ROM, increase tissue extensibility, and increase postural awareness to improve patient's ability to tolerate pain-free elevation   The manual therapy interventions were performed at a separate and distinct time from the therapeutic activities interventions. Billed With/As:   [] TE   [] TA   [] Neuro   [] Self Care Patient Education: [x] Review HEP    [] Progressed/Changed HEP based on:   [] positioning   [] body mechanics   [] transfers   [] heat/ice application    [] other:      Other Objective/Functional Measures:    Patient is currently 8 weeks post op  Added prone horizontal ABD      Post Treatment Pain Level (on 0 to 10) scale:   0  / 10     ASSESSMENT  Assessment/Changes in Function:     Good tolerance to current program &      []  See Progress Note/Recertification   Patient will continue to benefit from skilled PT services to modify and progress therapeutic interventions, address functional mobility deficits, address ROM deficits, address strength deficits, analyze and address soft tissue restrictions, analyze and cue movement patterns, analyze and modify body mechanics/ergonomics, assess and modify postural abnormalities, address imbalance/dizziness, and instruct in home and community integration to attain remaining goals.    Progress toward goals / Updated goals:    Progressing towards STG 3     PLAN  []  Upgrade activities as tolerated YES Continue plan of care   []  Discharge due to :    []  Other:      Therapist: Cornell Hewitt, PT    Date: 8/29/2022 Time: 9:52 AM     Future Appointments   Date Time Provider Jamila Christina   8/31/2022  8:45 AM Ethan Freeman, PT [facility]   9/6/2022  8:45 AM Deni Guzmán, PT [facility]   9/8/2022  8:45 AM Deni Guzmán, PT [facility]   9/12/2022  8:45 AM Deni Guzmán, PT [facility]   9/14/2022  8:45 AM Ethan Freeman, PT [facility]   9/19/2022  8:45 AM Deni Guzmán, PT [facility]   9/21/2022  8:00 AM Ethan Freeman, PT [facility]   9/26/2022  8:45 AM Deni Guzmán, PT [facility]   9/28/2022  8:30 AM Racquel Guajardo, PT [facility]

## 2022-08-31 ENCOUNTER — HOSPITAL ENCOUNTER (OUTPATIENT)
Dept: PHYSICAL THERAPY | Age: 62
Discharge: HOME | End: 2022-08-31
Payer: OTHER GOVERNMENT

## 2022-08-31 PROCEDURE — 97530 THERAPEUTIC ACTIVITIES: CPT

## 2022-08-31 PROCEDURE — 97110 THERAPEUTIC EXERCISES: CPT

## 2022-08-31 PROCEDURE — 97140 MANUAL THERAPY 1/> REGIONS: CPT

## 2022-09-06 ENCOUNTER — HOSPITAL ENCOUNTER (OUTPATIENT)
Dept: PHYSICAL THERAPY | Age: 62
Discharge: HOME | End: 2022-09-06
Payer: OTHER GOVERNMENT

## 2022-09-06 PROCEDURE — 97530 THERAPEUTIC ACTIVITIES: CPT

## 2022-09-06 PROCEDURE — 97140 MANUAL THERAPY 1/> REGIONS: CPT

## 2022-09-06 PROCEDURE — 97110 THERAPEUTIC EXERCISES: CPT

## 2022-09-06 NOTE — PROGRESS NOTES
PHYSICAL THERAPY - DAILY TREATMENT NOTE    Patient Name: Aletha Colon        Date: 2022  : 1960   YES Patient  Verified  Visit #:     Insurance: Payor:  / Plan: Facundo Benson 74 / Product Type:  /      In time: 8:50 A Out time: 9:40 A    Total Treatment Time: 55     BCBS/Medicare Time Tracking (below)   Total Timed Codes (min):  NA 1:1 Treatment Time:  NA     TREATMENT AREA =  Pain in right upper arm [M79.621]    SUBJECTIVE  Pain Level (on 0 to 10 scale):  0  / 10   Medication Changes/New allergies or changes in medical history, any new surgeries or procedures? NO    If yes, update Summary List   Subjective Functional Status/Changes:  []  No changes reported     Patient reports that he is now able to sleep on is R side with his arm supported on a pillow. He is not having pain in his arm when he supinates his forearm, but he does not do anything to aggravate the pain.             Modalities Rationale:     decrease edema, decrease inflammation, and decrease pain to improve patient's ability to return to pain-free ADLs    min [] Estim, type/location:                                      []  att     []  unatt     []  w/US     []  w/ice    []  w/heat    min []  Mechanical Traction: type/lbs                   []  pro   []  sup   []  int   []  cont    []  before manual    []  after manual    min []  Ultrasound, settings/location:      min []  Iontophoresis w/ dexamethasone, location:                                               []  take home patch       []  in clinic   10 min [x]  Ice     []  Heat    location/position: Supine to R elbow     min []  Vasopneumatic Device, press/temp:    If using vaso (only need to measure limb vaso being performed on)      pre-treatment girth :       post-treatment girth :       measured at (landmark location) :      min []  Other:    [] Skin assessment post-treatment (if applicable):    [x]  intact    [x]  redness- no adverse reaction []redness - adverse reaction:        20 min Therapeutic Exercise:  [x]  See flow sheet   Rationale:      increase ROM and increase strength to improve the patients ability to return to eventual lifting     10 min Therapeutic Activity: [x]  See flow sheet   Rationale:    increase ROM and increase strength to improve the patients ability to tolerate sleeping in R S/L position at night    15 min Manual Therapy: PROM for R shoulder flex/scaption, ER, gentle supination/pronation with elbow in flexion, passive elbow extension     Rationale:      decrease pain, increase ROM, and increase tissue extensibility to improve patient's ability to return to reaching overhead without compensation  The manual therapy interventions were performed at a separate and distinct time from the therapeutic activities interventions. Billed With/As:   [] TE   [] TA   [] Neuro   [] Self Care Patient Education: [x] Review HEP    [] Progressed/Changed HEP based on:   [] positioning   [] body mechanics   [] transfers   [] heat/ice application    [] other:      Other Objective/Functional Measures:    PROM: elbow flexion 140 degrees, 5 degrees from full extension     Post Treatment Pain Level (on 0 to 10) scale:   0  / 10     ASSESSMENT  Assessment/Changes in Function:     Good progress with ROM improvements as well as decreasing pain levels      []  See Progress Note/Recertification   Patient will continue to benefit from skilled PT services to modify and progress therapeutic interventions, address functional mobility deficits, address ROM deficits, address strength deficits, analyze and address soft tissue restrictions, analyze and cue movement patterns, analyze and modify body mechanics/ergonomics, assess and modify postural abnormalities, and instruct in home and community integration to attain remaining goals.    Progress toward goals / Updated goals:    Progressing towards STG 2 & 3     PLAN  []  Upgrade activities as tolerated YES Continue plan of care   []  Discharge due to :    [x]  Other: Progress note n/v for follow up with MD      Therapist: Ananth Lopez PT    Date: 9/6/2022 Time: 8:54 AM     Future Appointments   Date Time Provider Jamila Vasquez   9/8/2022  8:45 AM Susan Trejo, PT [facility]   9/12/2022  8:45 AM Susan Trejo, PT [facility]   9/14/2022  8:45 AM Jaylon Acosta, PT [facility]   9/19/2022  8:45 AM Susan Trejo, PT [facility]   9/21/2022  8:00 AM Jaylon Acosta, PT [facility]   9/26/2022  8:45 AM Susan Trejo, PT [facility]   9/28/2022  8:30 AM Tyree Flood, PT [facility]

## 2022-09-08 ENCOUNTER — HOSPITAL ENCOUNTER (OUTPATIENT)
Dept: PHYSICAL THERAPY | Age: 62
Discharge: HOME | End: 2022-09-08
Payer: OTHER GOVERNMENT

## 2022-09-08 PROCEDURE — 97110 THERAPEUTIC EXERCISES: CPT

## 2022-09-08 PROCEDURE — 97530 THERAPEUTIC ACTIVITIES: CPT

## 2022-09-08 PROCEDURE — 97140 MANUAL THERAPY 1/> REGIONS: CPT

## 2022-09-08 NOTE — PROGRESS NOTES
[address] [place] [address] PHYSICAL THERAPY AT [address] [place] [address] [address], [address] [place] [id number], [address]  Phone: [phone]  Fax: [phone]  PROGRESS NOTE  Patient Name: Mani Thomas : 1960   Treatment/Medical Diagnosis: Pain in right upper arm [M79.621]  S/p R distal biceps attachment (DOS 22)   Referral Source: Christiana De Jesus MD     Date of Initial Visit: 8-10-22 Attended Visits: 8 Missed Visits: 0     SUMMARY OF TREATMENT  Therapeutic exercise including ROM, stretching, gentle strengthening, stabilization training, postural ed, patient education, HEP instruction, manual stretching/passive stretching, CP. CURRENT STATUS  Mr. Sophia Nelson is currently 5 weeks s/p R distal biceps attachment (DOS 22). He continues to make good progress with PT & is compliant with activity restrictions, he generally reports no c/o pain in R elbow, mild c/o \"bone pain in radius\" that is sharp in nature with unexpected movements of his arm (I.e. if he supinates his arm while he is sleeping), although this is much less frequent in nature. Previous c/o tingling & numbness have resolved & c/o swelling in medial elbow at the end of the day as well as tolerance to sleeping at night uninterrupted continues to steadily improve. He demonstrates improved gross  strength at 91 lbs on R (as compared to 68.5 lbs at eval). Please see below for other improvements with PT. Goal/Measure of Progress Goal Met? 1. Patient will be independent and compliant with initial HEP. Status at last Eval: NA Current Status: HEP instruction  yes   2. Patient will report decreased pain levels to 0/10 in order to sleep through the night pain-free. Status at last Eval: 5/10 at worst  Current Status: Generally no c/o pain  1-2/10 at worst  yes   3. Demonstrate improved R elbow PROM to 0-140 deg to equal L and improve dressing abilities.     Status at last Eval:  degrees Current Status: 0-135 degrees  Mild c/o pain passive overpressure to flexion Partially met    4. Demonstrate improved R shoulder strength to 4+/5 to improve patients ability to improve stability of RUE required for recreational activity. Status at last Eval: See POC Current Status: 5/5 no c/o pain upon MMT yes     New Goals to be achieved in __3-4__  weeks:  1. Improve FOTO score from 43 to > or = 70 in order to indicate improved ease with ADLs. 2.  Patient to demonstrate no c/o pain with passive overpressure to R elbow pronation & supination, flexion so ROM is available for light housework/yard work. 3.  Improve overall strength of R elbow for all planes of motion to 5-/5 so strength is available for return to light lifting activities at work/home. 4.  Patient to be independent & compliant with HEP in preparation for D/C.       RECOMMENDATIONS  Please indicate any changes to current program & recommendations regarding progression of current protocol  If you have any questions/comments please contact us directly at [phone]. Thank you for allowing us to assist in the care of your patient. Therapist Signature: LEON Swartz, cert MDT Date: 6/9/7043   Certification Period:  Reporting Period: NA Time: 8:53 AM     NOTE TO PHYSICIAN:  PLEASE COMPLETE THE ORDERS BELOW AND FAX TO   [facility]: [phone]. If you are unable to process this request in 24 hours please contact our office: [phone].    ___ I have read the above report and request that my patient continue as recommended.   ___ I have read the above report and request that my patient continue therapy with the following changes/special instructions:_________________________________________________________   ___ I have read the above report and request that my patient be discharged from therapy.      Physician Signature:                                                             Date: Time:                                                                       Yumiko Schroeder,*

## 2022-09-08 NOTE — PROGRESS NOTES
PHYSICAL THERAPY - DAILY TREATMENT NOTE    Patient Name: Juan A Perkins        Date: 2022  : 1960   YES Patient  Verified  Visit #:     Insurance: Payor:  / Plan: Facundo Benson 74 / Product Type:  /      In time: 8:45 A Out time: 9:35 A   Total Treatment Time: 50     BCBS/Medicare Time Tracking (below)   Total Timed Codes (min):  NA 1:1 Treatment Time:  NA     TREATMENT AREA =  Pain in right upper arm [M79.621]    SUBJECTIVE  Pain Level (on 0 to 10 scale):  0  / 10   Medication Changes/New allergies or changes in medical history, any new surgeries or procedures?     NO    If yes, update Summary List   Subjective Functional Status/Changes:  []  No changes reported     See progress note to MD            Modalities Rationale:     decrease edema, decrease inflammation, and decrease pain to improve patient's ability to return to pain-free use of R elbow with ADLs    min [] Estim, type/location:                                      []  att     []  unatt     []  w/US     []  w/ice    []  w/heat    min []  Mechanical Traction: type/lbs                   []  pro   []  sup   []  int   []  cont    []  before manual    []  after manual    min []  Ultrasound, settings/location:      min []  Iontophoresis w/ dexamethasone, location:                                               []  take home patch       []  in clinic   10 min [x]  Ice     []  Heat    location/position: To R elbow in supine    min []  Vasopneumatic Device, press/temp:    If using vaso (only need to measure limb vaso being performed on)      pre-treatment girth :       post-treatment girth :       measured at (landmark location) :      min []  Other:    [] Skin assessment post-treatment (if applicable):    [x]  intact    [x]  redness- no adverse reaction                  []redness - adverse reaction:        15 min Therapeutic Exercise:  [x]  See flow sheet   Rationale:      increase ROM and increase strength to improve the patients ability to return to eventual lifting     15 min Therapeutic Activity: [x]  See flow sheet   Rationale:    increase ROM and increase strength to improve the patients ability to eat with silverware with improved mechanics    10 min Manual Therapy: Gentle manual stretch for elbow flex/ext, pronation/supination, gentle R shoulder ER stretch   Rationale:      decrease pain and increase ROM to improve patient's ability to return to reaching without compensation  The manual therapy interventions were performed at a separate and distinct time from the therapeutic activities interventions. Billed With/As:   [] TE   [] TA   [] Neuro   [] Self Care Patient Education: [x] Review HEP    [] Progressed/Changed HEP based on:   [] positioning   [] body mechanics   [] transfers   [] heat/ice application    [] other:      Other Objective/Functional Measures:    Gross  on R 91 lbs  FOTO 57 points  AROM/MMT see progress note     Post Treatment Pain Level (on 0 to 10) scale:   0  / 10     ASSESSMENT  Assessment/Changes in Function:     Mild tightness with end range flexion & supination passively, although PROM & functional use continues to improve     []  See Progress Note/Recertification   Patient will continue to benefit from skilled PT services to modify and progress therapeutic interventions, address functional mobility deficits, address ROM deficits, address strength deficits, analyze and address soft tissue restrictions, analyze and cue movement patterns, analyze and modify body mechanics/ergonomics, assess and modify postural abnormalities, and instruct in home and community integration to attain remaining goals.    Progress toward goals / Updated goals:    See progress note for progress with goals     PLAN  []  Upgrade activities as tolerated YES Continue plan of care   []  Discharge due to :    [x]  Other: Isused copy of note for follow up with MD     Therapist: Domingo Shipman PT    Date: 9/8/2022 Time: 11:35 AM     Future Appointments   Date Time Provider Jamila Vasquez   9/12/2022  8:45 AM Manuel Norton, PT [facility]   9/14/2022  8:45 AM Nancy Jean-Baptiste, PT [facility]   9/19/2022  8:45 AM Manuel Norton, PT [facility]   9/21/2022  8:00 AM Nancy Jean-Baptiste, PT [facility]   9/26/2022  8:45 AM Manuel Norton, PT [facility]   9/28/2022  8:30 AM Karen Oneal, PT [facility]

## 2022-09-12 ENCOUNTER — HOSPITAL ENCOUNTER (OUTPATIENT)
Dept: PHYSICAL THERAPY | Age: 62
Discharge: HOME | End: 2022-09-12
Payer: OTHER GOVERNMENT

## 2022-09-12 PROCEDURE — 97530 THERAPEUTIC ACTIVITIES: CPT

## 2022-09-12 PROCEDURE — 97110 THERAPEUTIC EXERCISES: CPT

## 2022-09-12 PROCEDURE — 97140 MANUAL THERAPY 1/> REGIONS: CPT

## 2022-09-12 NOTE — PROGRESS NOTES
PHYSICAL THERAPY - DAILY TREATMENT NOTE    Patient Name: Romy Lewis        Date: 2022  : 1960   YES Patient  Verified  Visit #:     Insurance: Payor:  / Plan: Facundo Benson 74 / Product Type:  /      In time: 8:45 A Out time: 9:35 A   Total Treatment Time: 50     BCBS/Medicare Time Tracking (below)   Total Timed Codes (min):  NA 1:1 Treatment Time:  NA     TREATMENT AREA =  Pain in right upper arm [M79.621]    SUBJECTIVE  Pain Level (on 0 to 10 scale):  0  / 10   Medication Changes/New allergies or changes in medical history, any new surgeries or procedures? NO    If yes, update Summary List   Subjective Functional Status/Changes:  []  No changes reported     Patient reports that he still has difficulty with brushing his teeth with his arm at his side, if he lifts his shoulder out to the side he has better fine motor control to brush his whole mouth.             Modalities Rationale:     decrease edema, decrease inflammation, and decrease pain to improve patient's ability to return to pain-free ADLs    min [] Estim, type/location:                                      []  att     []  unatt     []  w/US     []  w/ice    []  w/heat    min []  Mechanical Traction: type/lbs                   []  pro   []  sup   []  int   []  cont    []  before manual    []  after manual    min []  Ultrasound, settings/location:      min []  Iontophoresis w/ dexamethasone, location:                                               []  take home patch       []  in clinic   10 min [x]  Ice     []  Heat    location/position: Supine to R elbow     min []  Vasopneumatic Device, press/temp:    If using vaso (only need to measure limb vaso being performed on)      pre-treatment girth :       post-treatment girth :       measured at (landmark location) :      min []  Other:    [] Skin assessment post-treatment (if applicable):    [x]  intact    [x]  redness- no adverse reaction []redness - adverse reaction:        10 min Therapeutic Exercise:  [x]  See flow sheet   Rationale:      increase ROM and increase strength to improve the patients ability to return to safe eventual lifting using R UE    15 min Therapeutic Activity: [x]  See flow sheet   Rationale:    increase ROM, increase strength, improve coordination, and increase proprioception to improve the patients ability to brush his teeth using R UE     15 min Manual Therapy: Gentle R elbow passive stretch for flexion, supination, pronation, R shoulder [email or web address] in supine   Rationale:      decrease pain, increase ROM, increase tissue extensibility, and increase postural awareness to improve patient's ability to reaching overhead using R UE  The manual therapy interventions were performed at a separate and distinct time from the therapeutic activities interventions. Billed With/As:   [] TE   [] TA   [] Neuro   [] Self Care Patient Education: [x] Review HEP    [] Progressed/Changed HEP based on:   [] positioning   [] body mechanics   [] transfers   [] heat/ice application    [] other:      Other Objective/Functional Measures:    See flow sheet      Post Treatment Pain Level (on 0 to 10) scale:   0  / 10     ASSESSMENT  Assessment/Changes in Function:     Good tolerance to current program, tactile cues/oscillations to promote relaxation      []  See Progress Note/Recertification   Patient will continue to benefit from skilled PT services to modify and progress therapeutic interventions, address functional mobility deficits, address ROM deficits, address strength deficits, analyze and address soft tissue restrictions, analyze and cue movement patterns, analyze and modify body mechanics/ergonomics, assess and modify postural abnormalities, and instruct in home and community integration to attain remaining goals.    Progress toward goals / Updated goals:    Progressing towards newly established LTGs     PLAN  []  Upgrade activities as tolerated YES Continue plan of care   []  Discharge due to :    []  Other:      Therapist: Uzma Orona PT    Date: 9/12/2022 Time: 9:24 AM     Future Appointments   Date Time Provider Jamila Vasquez   9/14/2022  8:45 AM Carter Calvillo PT [facility]   9/19/2022  8:45 AM Loly Almonte PT [facility]   9/21/2022  8:00 AM Carter Calvillo PT [facility]   9/26/2022  8:45 AM Loly Almonte PT [facility]   9/28/2022  8:30 AM Elba Lugo PT [facility]

## 2022-09-14 ENCOUNTER — HOSPITAL ENCOUNTER (OUTPATIENT)
Dept: PHYSICAL THERAPY | Age: 62
Discharge: HOME | End: 2022-09-14
Payer: OTHER GOVERNMENT

## 2022-09-14 PROCEDURE — 97140 MANUAL THERAPY 1/> REGIONS: CPT

## 2022-09-14 PROCEDURE — 97110 THERAPEUTIC EXERCISES: CPT

## 2022-09-14 PROCEDURE — 97535 SELF CARE MNGMENT TRAINING: CPT

## 2022-09-14 NOTE — PROGRESS NOTES
PHYSICAL THERAPY - DAILY TREATMENT NOTE    Patient Name: Rochelle Winslow        Date: 2022  : 1960   YES Patient  Verified  Visit #:   10   of   12  Insurance: Payor:  / Plan: Facundo Benson 74 / Product Type:  /      In time: 845 Out time: 930   Total Treatment Time: 45     BCBS/Medicare Time Tracking (below)   Total Timed Codes (min):  NA 1:1 Treatment Time:  NA     TREATMENT AREA =  Pain in right upper arm [M79.621]    SUBJECTIVE  Pain Level (on 0 to 10 scale):  0  / 10   Medication Changes/New allergies or changes in medical history, any new surgeries or procedures? NO    If yes, update Summary List   Subjective Functional Status/Changes:  []  No changes reported     Pt reports he attended a virtual check up with MD; states he has no restrictions. Pt has to leave by 930 to get to another doctors appointment          Modalities Rationale:     decrease edema, decrease inflammation, and decrease pain to improve patient's ability to perform pain-free ADLs.     min [] Estim, type/location:                                      []  att     []  unatt     []  w/US     []  w/ice    []  w/heat    min []  Mechanical Traction: type/lbs                   []  pro   []  sup   []  int   []  cont    []  before manual    []  after manual    min []  Ultrasound, settings/location:      min []  Iontophoresis w/ dexamethasone, location:                                               []  take home patch       []  in clinic   10 min [x]  Ice     []  Heat    location/position: R elbow seated    min []  Vasopneumatic Device, press/temp:    If using vaso (only need to measure limb vaso being performed on)      pre-treatment girth :       post-treatment girth :       measured at (landmark location) :      min []  Other:    [x] Skin assessment post-treatment (if applicable):    [x]  intact    [x]  redness- no adverse reaction                  []redness - adverse reaction:        17 min Therapeutic Exercise:  [x]  See flow sheet   Rationale:      increase ROM, increase strength, improve coordination, and increase proprioception to improve the patients ability to perform unlimited ADLs. 15 min Manual Therapy: PROM and gentle stretching into shoulder flexion/ER, PROM into elbow flex/ext and pro/sup, scar mobilization    Rationale:      decrease pain, increase ROM, and increase tissue extensibility to improve patient's ability to improve mobility per protocol  The manual therapy interventions were performed at a separate and distinct time from the therapeutic activities interventions. 8 min Self Care: Discussed transition to new phase of rehab at 6 weeks post-op; now OK to perform active elbow ROM but important to avoid any resisted or weighted exercises or ADLs. Rationale:    increase ROM, increase strength, improve coordination, and increase proprioception to improve the patients ability to protect integrity of repai    Billed With/As:   [x] TE   [] TA   [] Neuro   [] Self Care Patient Education: [x] Review HEP    [] Progressed/Changed HEP based on:   [] positioning   [] body mechanics   [] transfers   [] heat/ice application    [] other:      Other Objective/Functional Measures: Added supine pro/sup at 90 deg elbow flexion to perform in gravity assisted position    Added supine flexion AAROM punch to activate tricep     Post Treatment Pain Level (on 0 to 10) scale:   0  / 10     ASSESSMENT  Assessment/Changes in Function:     Good tolerance to AROM today although range was limited. Good verbal understanding of restrictions at this level of recovery.      []  See Progress Note/Recertification   Patient will continue to benefit from skilled PT services to modify and progress therapeutic interventions, address functional mobility deficits, address ROM deficits, address strength deficits, analyze and address soft tissue restrictions, analyze and cue movement patterns, analyze and modify body mechanics/ergonomics, and assess and modify postural abnormalities to attain remaining goals. Progress toward goals / Updated goals:    Progressing towards LTG 3.      PLAN  [x]  Upgrade activities as tolerated YES Continue plan of care   []  Discharge due to :    []  Other:      Therapist: Ryan Gonzalez DPT    Date: 9/14/2022 Time: 9:32 AM     Future Appointments   Date Time Provider Jamila Vasquez   9/19/2022  8:45 AM Azalia Cisneros PT [facility]   9/21/2022  8:00 AM Vandana Segundo PT [facility]   9/26/2022  8:45 AM Azalia Cisneros PT [facility]   9/28/2022  8:30 AM Katya Chavez PT [facility]

## 2022-09-19 ENCOUNTER — HOSPITAL ENCOUNTER (OUTPATIENT)
Dept: PHYSICAL THERAPY | Age: 62
Discharge: HOME | End: 2022-09-19
Payer: OTHER GOVERNMENT

## 2022-09-19 PROCEDURE — 97530 THERAPEUTIC ACTIVITIES: CPT

## 2022-09-19 PROCEDURE — 97110 THERAPEUTIC EXERCISES: CPT

## 2022-09-19 PROCEDURE — 97140 MANUAL THERAPY 1/> REGIONS: CPT

## 2022-09-19 NOTE — PROGRESS NOTES
PHYSICAL THERAPY - DAILY TREATMENT NOTE    Patient Name: Rojas Hamm        Date: 2022  : 1960   YES Patient  Verified  Visit #:     Insurance: Payor:  / Plan: Facundo Benson 74 / Product Type:  /      In time: 8:45 A Out time: 9:40 A   Total Treatment Time: 50     BCBS/Medicare Time Tracking (below)   Total Timed Codes (min):  NA 1:1 Treatment Time:  NA     TREATMENT AREA =  Pain in right upper arm [M79.621]    SUBJECTIVE  Pain Level (on 0 to 10 scale):  0  / 10   Medication Changes/New allergies or changes in medical history, any new surgeries or procedures? NO    If yes, update Summary List   Subjective Functional Status/Changes:  []  No changes reported     Patient reports he will follow up with ortho MD in December to talk about possibly having TSR on L, he has been working on his stretches & is not lifting heavy.           Modalities Rationale:     decrease edema, decrease inflammation, and decrease pain to improve patient's return to PLOF   min [] Estim, type/location:                                      []  att     []  unatt     []  w/US     []  w/ice      w/heat    min []  Mechanical Traction: type/lbs                   []  pro   []  sup   []  int   []  cont    []  before manual    []  after manual    min []  Ultrasound, settings/location:      min []  Iontophoresis w/ dexamethasone, location:                                               []  take home patch       []  in clinic   10 min [x]  Ice     []  Heat    location/position: Supine to R elbow    min []  Vasopneumatic Device, press/temp:    If using vaso (only  to measure limb vaso being performed on)      pre-treatment girth :       post-treatment girth :       measured at (landmark location) :      min []  Other:    [] Skin assessment post-treatment (if applicable):    [x]  intact    [x]  redness- no adverse reaction                  []redness - adverse reaction:        10 min Therapeutic Exercise: [x]  See flow sheet   Rationale:      increase ROM and increase strength to improve the patients ability to return to eventual lifting     15 min Therapeutic Activity: [x]  See flow sheet   Rationale:    increase ROM, increase strength, improve coordination, and increase proprioception to improve the patients ability to perform all groominh     15 min Manual Therapy: PROM for R elbow flexion/extension, passive overpressure to supination    Rationale:      decrease pain, increase ROM, and increase tissue extensibility to improve patient's ability to return to full functional use of R elbow with dressing  The manual therapy interventions were performed at a separate and distinct time from the therapeutic activities interventions. Billed With/As:   [] TE   [] TA   [] Neuro   [] Self Care Patient Education: [x] Review HEP    [] Progressed/Changed HEP based on:   [] positioning   [] body mechanics   [] transfers   [] heat/ice application    [] other:      Other Objective/Functional Measures: Added sleeper stretch in R S/L, triceps extension (with elbow at side) with band, active supination stretches (self stretch in standing, supported on counter & AROM with dowel)     Post Treatment Pain Level (on 0 to 10) scale:   0  / 10     ASSESSMENT  Assessment/Changes in Function:     Good tolerance to progression of AROM     []  See Progress Note/Recertification   Patient will continue to benefit from skilled PT services to modify and progress therapeutic interventions, address functional mobility deficits, address ROM deficits, address strength deficits, analyze and address soft tissue restrictions, analyze and cue movement patterns, analyze and modify body mechanics/ergonomics, assess and modify postural abnormalities, and address imbalance/dizziness to attain remaining goals.    Progress toward goals / Updated goals:    Progressing towards LTG 1 & 2     PLAN  []  Upgrade activities as tolerated YES Continue plan of care []  Discharge due to :    []  Other:      Therapist: Vidal Velázquez, PT    Date: 9/19/2022 Time: 9:21 AM     Future Appointments   Date Time Provider Jamila Vasquez   9/21/2022  8:00 AM Lucia Gomez, PT [facility]   9/26/2022  8:45 AM Minor Clubs, PT [facility]   9/28/2022  8:30 AM Tammy Morning, PT [facility]

## 2022-09-21 ENCOUNTER — HOSPITAL ENCOUNTER (OUTPATIENT)
Dept: PHYSICAL THERAPY | Age: 62
Discharge: HOME | End: 2022-09-21
Payer: OTHER GOVERNMENT

## 2022-09-21 PROCEDURE — 97140 MANUAL THERAPY 1/> REGIONS: CPT

## 2022-09-21 PROCEDURE — 97530 THERAPEUTIC ACTIVITIES: CPT

## 2022-09-21 PROCEDURE — 97110 THERAPEUTIC EXERCISES: CPT

## 2022-09-21 NOTE — PROGRESS NOTES
PHYSICAL THERAPY - DAILY TREATMENT NOTE    Patient Name: Olaf Painter        Date: 2022  : 1960   YES Patient  Verified  Visit #:    12  of   15  Insurance: Payor: DORINDA / Plan: Facundo Benson 74 / Product Type:  /      In time: 800 Out time: 855   Total Treatment Time: 55     BCBS/Medicare Time Tracking (below)   Total Timed Codes (min):  45 1:1 Treatment Time:  45     TREATMENT AREA =  Pain in right upper arm [M79.621]    SUBJECTIVE  Pain Level (on 0 to 10 scale):  0  / 10   Medication Changes/New allergies or changes in medical history, any new surgeries or procedures? NO    If yes, update Summary List   Subjective Functional Status/Changes:  []  No changes reported     I have some soreness in the elbow but very minimal          Modalities Rationale:     decrease edema, decrease inflammation, and decrease pain to improve patient's ability to perform pain-free ADLs.     min [] Estim, type/location:                                      []  att     []  unatt     []  w/US     []  w/ice    []  w/heat    min []  Mechanical Traction: type/lbs                   []  pro   []  sup   []  int   []  cont    []  before manual    []  after manual    min []  Ultrasound, settings/location:      min []  Iontophoresis w/ dexamethasone, location:                                               []  take home patch       []  in clinic   10 min [x]  Ice     []  Heat    location/position: L elbow seated    min []  Vasopneumatic Device, press/temp:    If using vaso (only need to measure limb vaso being performed on)      pre-treatment girth :       post-treatment girth :       measured at (landmark location) :      min []  Other:    [x] Skin assessment post-treatment (if applicable):    [x]  intact    [x]  redness- no adverse reaction                  []redness - adverse reaction:        20 min Therapeutic Exercise:  [x]  See flow sheet   Rationale:      increase ROM, increase strength, improve coordination, and increase proprioception to improve the patients ability to perform unlimited ADLs. 10 min Manual Therapy: PROM for R elbow flexion/extension, passive overpressure to supination    Rationale:      decrease pain, increase ROM, increase tissue extensibility, and decrease edema  to improve patient's ability to use L elbow for dressing  The manual therapy interventions were performed at a separate and distinct time from the therapeutic activities interventions. 15 min Therapeutic Activity: [x]  See flow sheet   Rationale:    increase ROM, increase strength, and improve coordination to improve the patients ability to resume OH motion    Billed With/As:   [x] TE   [] TA   [] Neuro   [] Self Care Patient Education: [x] Review HEP    [] Progressed/Changed HEP based on:   [] positioning   [] body mechanics   [] transfers   [] heat/ice application    [] other:      Other Objective/Functional Measures: Added Tband IR/ER and overpressure into AROM supination     Post Treatment Pain Level (on 0 to 10) scale:   0  / 10     ASSESSMENT  Assessment/Changes in Function:     Good tolerance to progressed exercises today with no reports of soreness/discomfort throughout. []  See Progress Note/Recertification   Patient will continue to benefit from skilled PT services to modify and progress therapeutic interventions, address functional mobility deficits, address ROM deficits, address strength deficits, analyze and address soft tissue restrictions, analyze and cue movement patterns, analyze and modify body mechanics/ergonomics, and assess and modify postural abnormalities to attain remaining goals. Progress toward goals / Updated goals:    Progressing towards LTG 3.       PLAN  [x]  Upgrade activities as tolerated YES Continue plan of care   []  Discharge due to :    []  Other:      Therapist: Angeline Jaramillo DPT    Date: 9/21/2022 Time: 7:48 AM     Future Appointments   Date Time Provider Jamila Vasquez 9/21/2022  8:00 AM Jaylon Acosta, PT [facility]   9/26/2022  8:45 AM Susan Trejo, PT [facility]   9/28/2022  8:30 AM Tyree Flood, PT [facility]

## 2022-09-26 ENCOUNTER — HOSPITAL ENCOUNTER (OUTPATIENT)
Dept: PHYSICAL THERAPY | Age: 62
Discharge: HOME | End: 2022-09-26
Payer: OTHER GOVERNMENT

## 2022-09-26 PROCEDURE — 97140 MANUAL THERAPY 1/> REGIONS: CPT

## 2022-09-26 PROCEDURE — 97110 THERAPEUTIC EXERCISES: CPT

## 2022-09-26 PROCEDURE — 97530 THERAPEUTIC ACTIVITIES: CPT

## 2022-09-26 NOTE — PROGRESS NOTES
PHYSICAL THERAPY - DAILY TREATMENT NOTE    Patient Name: Jean Carlos Romano        Date: 2022  : 1960   YES Patient  Verified  Visit #:   15   of   15  Insurance: Payor:  / Plan: Facundo Benson 74 / Product Type:  /      In time: 8:45 A Out time: 9:45 A   Total Treatment Time: 55     BCBS/Medicare Time Tracking (below)   Total Timed Codes (min):  NA 1:1 Treatment Time:  NA     TREATMENT AREA =  Pain in right upper arm [M79.621]  SUBJECTIVE  Pain Level (on 0 to 10 scale):  0  / 10   Medication Changes/New allergies or changes in medical history, any new surgeries or procedures? NO    If yes, update Summary List   Subjective Functional Status/Changes:  []  No changes reported     Patient reports he may have overdone it over the weeked but his supination is still tight.             Modalities Rationale:     decrease edema, decrease inflammation, and decrease pain to improve patient's ability to return to pain-free use of R elbow with ADLs    min [] Estim, type/location:                                      []  att     []  unatt     []  w/US     []  w/ice    []  w/heat    min []  Mechanical Traction: type/lbs                   []  pro   []  sup   []  int   []  cont    []  before manual    []  after manual    min []  Ultrasound, settings/location:      min []  Iontophoresis w/ dexamethasone, location:                                               []  take home patch       []  in clinic   10 min [x]  Ice     []  Heat    location/position: Supine to R elbow    min []  Vasopneumatic Device, press/temp:    If using vaso (only need to measure limb vaso being performed on)      pre-treatment girth :       post-treatment girth :       measured at (landmark location) :      min []  Other:    [] Skin assessment post-treatment (if applicable):    [x]  intact    [x]  redness- no adverse reaction                  []redness - adverse reaction:        15 min Therapeutic Exercise:  [x]  See flow sheet Rationale:      increase ROM and increase strength to improve the patients ability to return to light lifting      15 min Therapeutic Activity: [x]  See flow sheet   Rationale:    increase ROM and increase strength to improve the patients ability to return to eating without scapular compensation     15 min Manual Therapy: Gentle PROM/manual stretching for flexion, supination    Rationale:      decrease pain, increase ROM, and increase tissue extensibility to improve patient's ability to return to independent dressing   The manual therapy interventions were performed at a separate and distinct time from the therapeutic activities interventions. Billed With/As:   [] TE   [] TA   [] Neuro   [] Self Care Patient Education: [x] Review HEP    [] Progressed/Changed HEP based on:   [] positioning   [] body mechanics   [] transfers   [] heat/ice application    [] other:      Other Objective/Functional Measures:    See flow sheet     Post Treatment Pain Level (on 0 to 10) scale:   0  / 10     ASSESSMENT  Assessment/Changes in Function:     Improved tolerance to active supination today, no c/o pain, limited by tightness     []  See Progress Note/Recertification   Patient will continue to benefit from skilled PT services to modify and progress therapeutic interventions, address functional mobility deficits, address ROM deficits, address strength deficits, analyze and address soft tissue restrictions, analyze and cue movement patterns, analyze and modify body mechanics/ergonomics, assess and modify postural abnormalities, and instruct in home and community integration to attain remaining goals.    Progress toward goals / Updated goals:    Progressing towards LTG 1 & 2     PLAN  []  Upgrade activities as tolerated YES Continue plan of care   []  Discharge due to :    []  Other:      Therapist: Washington Estevez PT    Date: 9/26/2022 Time: 11:14 AM     Future Appointments   Date Time Provider Jamila Vasquez 9/28/2022  8:30 AM Kayla Falcon, PT [facility]

## 2022-09-28 ENCOUNTER — APPOINTMENT (OUTPATIENT)
Dept: PHYSICAL THERAPY | Age: 62
End: 2022-09-28
Payer: OTHER GOVERNMENT

## 2022-10-06 ENCOUNTER — HOSPITAL ENCOUNTER (OUTPATIENT)
Dept: PHYSICAL THERAPY | Age: 62
Discharge: HOME | End: 2022-10-06
Payer: OTHER GOVERNMENT

## 2022-10-06 PROCEDURE — 97140 MANUAL THERAPY 1/> REGIONS: CPT

## 2022-10-06 PROCEDURE — 97530 THERAPEUTIC ACTIVITIES: CPT

## 2022-10-06 PROCEDURE — 97110 THERAPEUTIC EXERCISES: CPT

## 2022-10-06 NOTE — PROGRESS NOTES
[facility] [address] [place] [address]  [address], [address] [place] [address], [address]  Phone: [phone]  Fax: [phone] SUMMARY FOR PHYSICAL THERAPY          Patient Name: Javi Guajardo : 1960   Treatment/Medical Diagnosis: Pain in right upper arm [M79.621]   Onset Date: 22    Referral Source: Jake Cramp Start of Care [facility]): 8/10/22   Prior Hospitalization: See Medical History Provider #: [id number]   Prior Level of Function: Independent all ADL's and weight training   Comorbidities: TB, Arthritis, Alcohol use, Cancer(skin), C&-C3 fusion   Medications: Verified on Patient Summary List   [facility] from [facility]: 14 Missed Visits: 0     Previous Goals: 1. Improve FOTO score from 43 to > or = 70 in order to indicate improved ease with ADLs. 2.Patient to demonstrate no c/o pain with passive overpressure to R elbow pronation & supination, flexion so ROM is available for light housework/yard work. 3.Improve overall strength of R elbow for all planes of motion to 5-/5 so strength is available for return to light lifting activities at work/home. 4. Patient to be independent & compliant with HEP in preparation for D/C. Prior Level/Current Level:  1) Prior Level: 43   Current Level: 64   Goal Met? MET per verbal reports of independent usage  2) Prior Level: pain with PROM   Current Level: Pain free PROM. Patient able to perform light housework painfree   Goal Met? yes  3) Prior Level: 4-/5   Current Level: 5-/5 in all planes    Goal Met? yes  4) Prior Level: ongoing   Current Level: Independent with HEP and it's progression for strengthening and flexibility   Goal Met? yes    Key Functional Changes/Progress: Mr. Coleen Olivia has made good progress in his physical therapy consistently reporting decreased pain and increased functional use of the R UE w/ ADL's and light house/yardwork.   He demonstrates full ROM of the R elbow and near full ROM of the right wrist. He is able to perform full AROM following stretch/hold activities. He is currently independent with his HEP and all ADL's at home. Assessments/Recommendations: Discontinue therapy. Progressing towards or have reached established goals. If you have any questions/comments please contact us directly at [phone]. Thank you for allowing us to assist in the care of your patient.     Therapist Signature: Chapis Mccarthy PT Date: 10/6/22   Reporting Period: N/a Time: 8:56 AM

## 2022-10-06 NOTE — PROGRESS NOTES
PHYSICAL THERAPY - DAILY TREATMENT NOTE     Patient Name: Kandi Amezquita        Date: 10/6/2022  : 1960   YES Patient  Verified  Visit #:     Insurance: Payor: DORINDA / Plan: Facundo Benson 74 / Product Type:  /      In time: 820 Out time: 910   Total Treatment Time: 50     Medicare/BCBS Time Tracking (below)   Total Timed Codes (min):   1:1 Treatment Time:       TREATMENT AREA =  Pain in right upper arm [M79.621]    SUBJECTIVE    Pain Level (on 0 to 10 scale):  0  / 10   Medication Changes/New allergies or changes in medical history, any new surgeries or procedures?     NO    If yes, update Summary List   Subjective Functional Status/Changes:  []  No changes reported       Functional improvements: \"I feel good overall\"           OBJECTIVE  Modalities Rationale:     decrease inflammation and decrease pain to improve patient's ability to perform ADL's w/o pain      min [] Estim, type/location:                                      []  att     []  unatt     []  w/US     []  w/ice    []  w/heat    min []  Mechanical Traction: type/lbs                   []  pro   []  sup   []  int   []  cont    []  before manual    []  after manual    min []  Ultrasound, settings/location:      min []  Iontophoresis w/ dexamethasone, location:                                               []  take home patch       []  in clinic   10 min [x]  Ice     []  Heat    location/position: Sitting to R elbow    min []  Vasopneumatic Device, press/temp:  If using vaso (only need to measure limb vaso being performed on)      pre-treatment girth :       post-treatment girth :       measured at (landmark location) :       min []  Other:    [x] Skin assessment post-treatment (if applicable):    [x]  intact    [x]  redness- no adverse reaction     []redness - adverse reaction:         10 min Therapeutic Exercise:  [x]  See flow sheet   Rationale:      increase ROM and increase strength to improve the patients ability to return to light lifting       15 min Therapeutic Activity: [x]  See flow sheet   Rationale:    increase ROM and increase strength to improve the patients ability to return to eating without scapular compensation      15 min Manual Therapy: Gentle PROM/manual stretching for flexion, supination    Rationale:      decrease pain, increase ROM, and increase tissue extensibility to improve patient's ability to return to independent dressing   The manual therapy interventions were performed at a separate and distinct time from the therapeutic activities interventions. Billed With/As:   [x] TE   [x] TA   [] Neuro   [] Self Care Patient Education: [x] Review HEP    [] Progressed/Changed HEP based on:   [] positioning   [] body mechanics   [] transfers   [] heat/ice application    [] other:        Other Objective/Functional Measures:    See D/C summary     Post Treatment Pain Level (on 0 to 10) scale:   0  / 10     ASSESSMENT    Assessment/Changes in Function:     See D/C summary     []  See Progress Note/Recertification   Patient will continue to benefit from skilled PT services to modify and progress therapeutic interventions, address functional mobility deficits, address ROM deficits, address strength deficits, analyze and address soft tissue restrictions, analyze and cue movement patterns, analyze and modify body mechanics/ergonomics, and assess and modify postural abnormalities to attain remaining goals.       Progress toward goals / Updated goals:    See D/C summary     PLAN    [x]  Upgrade activities as tolerated YES Continue plan of care   []  Discharge due to :    []  Other:      Therapist: Javan Cesar PT    Date: 10/6/2022 Time: 7:48 AM     Future Appointments   Date Time Provider Jamila Vasquez   10/6/2022  8:20 AM Danny Nageotte, PT [facility]

## 2024-04-01 ENCOUNTER — HOSPITAL ENCOUNTER (OUTPATIENT)
Facility: HOSPITAL | Age: 64
Setting detail: RECURRING SERIES
Discharge: HOME | End: 2024-04-04
Payer: MEDICARE

## 2024-04-01 PROCEDURE — 97530 THERAPEUTIC ACTIVITIES: CPT

## 2024-04-01 PROCEDURE — 97110 THERAPEUTIC EXERCISES: CPT

## 2024-04-01 PROCEDURE — 97161 PT EVAL LOW COMPLEX 20 MIN: CPT

## 2024-04-01 NOTE — PROGRESS NOTES
[facility]  [address], [address], [place] [address] Ph: [phone] Fx: [phone]  Plan of Care / Statement of Necessity for Physical Therapy Services     Patient Name: Onel Livingston : 1960   Medical   Diagnosis: Pain in left shoulder [M25.512] Treatment Diagnosis:  M25.512  LEFT SHOULDER PAIN    Onset Date: DOS 3/29/2024     Referral Source: Tc Yarbrough DO Start of Care (SOC): 2024   Prior Hospitalization: See medical history Provider #: [id number]   Prior Level of Function: Independent with ADLs, functional, and daily tasks with left shoulder pain.    Comorbidities: 24 surgeries including l/s and c/s fusions, left shoulder surgery, melanoma, arthritis, hernia repair.       Assessment / key information:    Pt is a 63 year old male who presents to therapy today with left shoulder pain s/p TSA DOS 3/29/2024. Pt denies complications with the surgery and states his physician cleared him to start therapy. He presents to therapy today in his sling. Pt demonstrated decreased PROM of the left shoulder. Bandage was removed (pt states that the physician told him he can take the bandage off 2 days post op). Pt would benefit from physical therapy to improve the above impairments to help the pt return to performing ADLs, functional and daily activities.     Evaluation Complexity:  History:  HIGH Complexity :3+ comorbidities / personal factors will impact the outcome/ POC ; Examination:  LOW Complexity : 1-2 Standardized tests and measures addressing body structure, function, activity limitation and / or participation in recreation  ;Presentation:  LOW Complexity : Stable, uncomplicated  ;Clinical Decision Making:  HIGH Complexity : FOTO score of 1- 25  FOTO score = an established functional score where 100 = no disability  Overall Complexity Rating: LOW   Problem List: pain affecting function, decrease ROM, decrease strength, edema affection

## 2024-04-01 NOTE — PROGRESS NOTES
PHYSICAL / OCCUPATIONAL THERAPY - DAILY TREATMENT NOTE (updated )    Patient Name: Onel Livingston    Date: 2024    : 1960  Insurance: Payor: MEDICARE / Plan: MEDICARE PART A AND B / Product Type: *No Product type* /      Patient  verified Yes     Visit #   Current / Total 1 36   Time   In / Out 2:19 3:01   Pain   In / Out 4-5 4-5   Subjective Functional Status/Changes: See POC   Changes to:  Meds, Allergies, Med Hx, Sx Hx?  If yes, update Summary List no       TREATMENT AREA =  Pain in left shoulder [M25.512]    OBJECTIVE    Modalities Rationale:     decrease edema, decrease inflammation, and decrease pain to improve patient's ability to progress to PLOF and address remaining functional goals.    12 (during pt education below) min  unbill [x]  Ice     []  Heat    location/position: Sitting; left shoulder; during pt education below   Skin assessment post-treatment (if applicable):    []  intact    []  redness- no adverse reaction                 []redness - adverse reaction:          18 min   Eval - untimed                      Therapeutic Procedures:  Tx Min Billable or 1:1 Min (if diff from Tx Min) Procedure, Rationale, Specifics   12  53643 Therapeutic Exercise (timed):  increase ROM, strength, coordination, balance, and proprioception to improve patient's ability to progress to PLOF and address remaining functional goals. (see flow sheet as applicable)     Details if applicable:  verbal HEP instruction      12  18230 Therapeutic Activity (timed):  use of dynamic activities replicating functional movements to increase ROM, strength, coordination, balance, and proprioception in order to improve patient's ability to progress to PLOF and address remaining functional goals.  (see flow sheet as applicable)    Details if applicable:  pt education on relevant anatomy/physiology, pt education on regarding protocol and restrictions per protocol   24  [facility] Totals Reminder: bill using total billable min of

## 2024-04-03 ENCOUNTER — HOSPITAL ENCOUNTER (OUTPATIENT)
Facility: HOSPITAL | Age: 64
Setting detail: RECURRING SERIES
Discharge: HOME | End: 2024-04-06
Payer: MEDICARE

## 2024-04-03 PROCEDURE — 97110 THERAPEUTIC EXERCISES: CPT

## 2024-04-03 PROCEDURE — 97530 THERAPEUTIC ACTIVITIES: CPT

## 2024-04-03 NOTE — PROGRESS NOTES
PHYSICAL / OCCUPATIONAL THERAPY - DAILY TREATMENT NOTE (updated )    Patient Name: Onel Livingston    Date: 4/3/2024    : 1960  Insurance: Payor: MEDICARE / Plan: MEDICARE PART A AND B / Product Type: *No Product type* /      Patient  verified Yes     Visit #   Current / Total 2 36   Time   In / Out 10:22 11:06   Pain   In / Out 2-3 0   Subjective Functional Status/Changes: Pt reports today is a good day.    Changes to:  Meds, Allergies, Med Hx, Sx Hx?  If yes, update Summary List no       TREATMENT AREA =  Pain in left shoulder [M25.512]    OBJECTIVE  Therapeutic Procedures:  Tx Min Billable or 1:1 Min (if diff from Tx Min) Procedure, Rationale, Specifics   34  60975 Therapeutic Exercise (timed):  increase ROM, strength, coordination, balance, and proprioception to improve patient's ability to progress to PLOF and address remaining functional goals. (see flow sheet as applicable)     Details if applicable:  exercises; in supine: PROM left shoulder flex to 90 degs, ER to neutral.      10  36777 Therapeutic Activity (timed):  use of dynamic activities replicating functional movements to increase ROM, strength, coordination, balance, and proprioception in order to improve patient's ability to progress to PLOF and address remaining functional goals.  (see flow sheet as applicable)    Details if applicable: pt education on regarding protocol and restrictions per protocol   44  [facility] Totals Reminder: bill using total billable min of TIMED therapeutic procedures (example: do not include dry needle or estim unattended, both untimed codes, in totals to left)  8-22 min = 1 unit; 23-37 min = 2 units; 38-52 min = 3 units; 53-67 min = 4 units; 68-82 min = 5 units   Total Total     TOTAL TREATMENT TIME:        44     [x]  Patient Education billed concurrently with other procedures   [x] Review HEP    [] Progressed/Changed HEP, detail:    [] Other detail:       Objective Information/Functional

## 2024-04-05 ENCOUNTER — HOSPITAL ENCOUNTER (OUTPATIENT)
Facility: HOSPITAL | Age: 64
Setting detail: RECURRING SERIES
Discharge: HOME | End: 2024-04-08
Payer: MEDICARE

## 2024-04-05 PROCEDURE — 97110 THERAPEUTIC EXERCISES: CPT

## 2024-04-05 NOTE — PROGRESS NOTES
PHYSICAL / OCCUPATIONAL THERAPY - DAILY TREATMENT NOTE (updated )    Patient Name: Onel Livingston    Date: 2024    : 1960  Insurance: Payor: MEDICARE / Plan: MEDICARE PART A AND B / Product Type: *No Product type* /      Patient  verified Yes     Visit #   Current / Total 3 36   Time   In / Out 7:41 8:30   Pain   In / Out 3-4 4   Subjective Functional Status/Changes: Pt reports he is stiff today.    Changes to:  Meds, Allergies, Med Hx, Sx Hx?  If yes, update Summary List no       TREATMENT AREA =  Pain in left shoulder [M25.512]    OBJECTIVE    Modalities Rationale:     decrease edema, decrease inflammation, and decrease pain to improve patient's ability to progress to PLOF and address remaining functional goals.    10 min  unbill [x]  Ice     []  Heat    location/position:    Skin assessment post-treatment (if applicable):    []  intact    []  redness- no adverse reaction                 []redness - adverse reaction:          Therapeutic Procedures:  Tx Min Billable or 1:1 Min (if diff from Tx Min) Procedure, Rationale, Specifics   39  89056 Therapeutic Exercise (timed):  increase ROM, strength, coordination, balance, and proprioception to improve patient's ability to progress to PLOF and address remaining functional goals. (see flow sheet as applicable)     Details if applicable:  exercises; in supine: PROM left shoulder flex to 90 degs, ER to neutral; all with gentle left GHJ distraction     39  [facility] Totals Reminder: bill using total billable min of TIMED therapeutic procedures (example: do not include dry needle or estim unattended, both untimed codes, in totals to left)  8-22 min = 1 unit; 23-37 min = 2 units; 38-52 min = 3 units; 53-67 min = 4 units; 68-82 min = 5 units   Total Total     TOTAL TREATMENT TIME:        49     [x]  Patient Education billed concurrently with other procedures   [x] Review HEP    [] Progressed/Changed HEP, detail:    [] Other detail:       Objective

## 2024-04-08 ENCOUNTER — HOSPITAL ENCOUNTER (OUTPATIENT)
Facility: HOSPITAL | Age: 64
Setting detail: RECURRING SERIES
Discharge: HOME | End: 2024-04-11
Payer: MEDICARE

## 2024-04-08 PROCEDURE — 97110 THERAPEUTIC EXERCISES: CPT | Performed by: PHYSICAL THERAPIST

## 2024-04-08 PROCEDURE — 97140 MANUAL THERAPY 1/> REGIONS: CPT | Performed by: PHYSICAL THERAPIST

## 2024-04-08 NOTE — PROGRESS NOTES
neutral  Current: to neutral 4/8/24  Long Term Goals: To be accomplished in 36 treatments  Pt will increase FOTO score to 56 points to improve ability to perform ADLs.  Status at last note/certification: 4 points  2.  Pt will improve AROM left shoulder flex/ABD to at least 120 degs to improve ability to overhead reaching when appropriate.  Status at last note/certification: currently PROM only   3.  Pt will increase AROM left shoulder ER/IR to 50 degs to improve ability to dress independently.  Status at last note/certification: PROM only.  4.  Pt will report being able to reach to a overhead shelf with minimal to no pain with the left UE to improve independence with daily   Status at last note/certification: unable due to protocol      PLAN  Yes  Continue plan of care  []  Upgrade activities as tolerated  []  Discharge due to :  []  Other:    Marylin Wilson PT    4/8/2024    7:54 AM    Future Appointments   Date Time Provider Department Center   4/8/2024  8:20 AM Marylin Wilson, PT [facility]   4/12/2024  8:20 AM Jeferson Zapata PTA [facility]   4/15/2024  8:20 AM Marylin Wilson, PT [facility]   4/19/2024  8:20 AM Marylin Wilson, PT [facility]   4/22/2024  8:20 AM Marylin Wilson, PT [facility]   4/24/2024  8:20 AM Marylin Wilson, PT [facility]   4/26/2024  8:20 AM Marylin Wilson, PT [facility]   4/29/2024  8:20 AM Marylin Wilson, PT [facility]   5/1/2024  8:20 AM Marylin Wilson, PT [facility]   5/3/2024  8:20 AM Jeferson Zapata, PTA [facility]

## 2024-04-10 ENCOUNTER — APPOINTMENT (OUTPATIENT)
Facility: HOSPITAL | Age: 64
End: 2024-04-10
Payer: MEDICARE

## 2024-04-12 ENCOUNTER — HOSPITAL ENCOUNTER (OUTPATIENT)
Facility: HOSPITAL | Age: 64
Setting detail: RECURRING SERIES
Discharge: HOME | End: 2024-04-15
Payer: MEDICARE

## 2024-04-12 PROCEDURE — 97110 THERAPEUTIC EXERCISES: CPT

## 2024-04-12 PROCEDURE — 97140 MANUAL THERAPY 1/> REGIONS: CPT

## 2024-04-12 NOTE — PROGRESS NOTES
PHYSICAL / OCCUPATIONAL THERAPY - DAILY TREATMENT NOTE (updated )    Patient Name: Onel Livingston    Date: 2024    : 1960  Insurance: Payor: MEDICARE / Plan: MEDICARE PART A AND B / Product Type: *No Product type* /      Patient  verified Yes     Visit #   Current / Total 4 36   Time   In / Out 820 910   Pain   In / Out 2-3 3   Subjective Functional Status/Changes: Been doing well, no really problems.   Changes to:  Meds, Allergies, Med Hx, Sx Hx?  If yes, update Summary List no       TREATMENT AREA =  Pain in left shoulder [M25.512]    OBJECTIVE    Modalities Rationale:     decrease edema, decrease inflammation, and decrease pain to improve patient's ability to progress to PLOF and address remaining functional goals.    10 min  unbill [x]  Ice     []  Heat    location/position: Sitting to L    Skin assessment post-treatment (if applicable):    []  intact    []  redness- no adverse reaction                 []redness - adverse reaction:          Therapeutic Procedures:  Tx Min Billable or 1:1 Min (if diff from Tx Min) Procedure, Rationale, Specifics   15  58422 Therapeutic Exercise (timed):  increase ROM, strength, coordination, balance, and proprioception to improve patient's ability to progress to PLOF and address remaining functional goals. (see flow sheet as applicable)     Details if applicable:  exercises; in supine: PROM left shoulder flex to 90 degs, ER to neutral; all with gentle left GHJ distraction, scapular mobes with DTM     25  17688 Manual Therapy (timed):  decrease pain, increase ROM, increase tissue extensibility, and decrease trigger points to improve patient's ability to progress to PLOF and address remaining functional goals.  The manual therapy interventions were performed at a separate and distinct time from the therapeutic activities interventions . (see flow sheet as applicable)      40  [facility] Totals Reminder: bill using total billable min of TIMED therapeutic procedures

## 2024-04-15 ENCOUNTER — HOSPITAL ENCOUNTER (OUTPATIENT)
Facility: HOSPITAL | Age: 64
Setting detail: RECURRING SERIES
Discharge: HOME | End: 2024-04-18
Payer: MEDICARE

## 2024-04-15 PROCEDURE — 97140 MANUAL THERAPY 1/> REGIONS: CPT | Performed by: PHYSICAL THERAPIST

## 2024-04-15 PROCEDURE — 97110 THERAPEUTIC EXERCISES: CPT | Performed by: PHYSICAL THERAPIST

## 2024-04-15 NOTE — PROGRESS NOTES
PHYSICAL / OCCUPATIONAL THERAPY - DAILY TREATMENT NOTE (updated )    Patient Name: Onel Livingston    Date: 4/15/2024    : 1960  Insurance: Payor: MEDICARE / Plan: MEDICARE PART A AND B / Product Type: *No Product type* /      Patient  verified Yes     Visit #   Current / Total 5 36   Time   In / Out 820am 908am   Pain   In / Out 2 2   Subjective Functional Status/Changes: I am still not sleeping well in the recliner. Everything just feels tight.    Changes to:  Meds, Allergies, Med Hx, Sx Hx?  If yes, update Summary List no       TREATMENT AREA =  Pain in left shoulder [M25.512]    OBJECTIVE    Modalities Rationale:     decrease edema, decrease inflammation, and decrease pain to improve patient's ability to progress to PLOF and address remaining functional goals.    10 min  unbill [x]  Ice     []  Heat    location/position: Sitting to L    Skin assessment post-treatment (if applicable):    []  intact    []  redness- no adverse reaction                 []redness - adverse reaction:          Therapeutic Procedures:  Tx Min Billable or 1:1 Min (if diff from Tx Min) Procedure, Rationale, Specifics   13  92866 Therapeutic Exercise (timed):  increase ROM, strength, coordination, balance, and proprioception to improve patient's ability to progress to PLOF and address remaining functional goals. (see flow sheet as applicable)     Details if applicable:  exercises; in supine: PROM left shoulder flex to 90 degs, ER to neutral; all with gentle left GHJ distraction, scapular mobes with DTM     25  40123 Manual Therapy (timed):  decrease pain, increase ROM, increase tissue extensibility, and decrease trigger points to improve patient's ability to progress to PLOF and address remaining functional goals.  The manual therapy interventions were performed at a separate and distinct time from the therapeutic activities interventions . (see flow sheet as applicable)      38  [facility] Totals Reminder: bill using total billable

## 2024-04-17 ENCOUNTER — APPOINTMENT (OUTPATIENT)
Facility: HOSPITAL | Age: 64
End: 2024-04-17
Payer: MEDICARE

## 2024-04-19 ENCOUNTER — HOSPITAL ENCOUNTER (OUTPATIENT)
Facility: HOSPITAL | Age: 64
Setting detail: RECURRING SERIES
Discharge: HOME | End: 2024-04-22
Payer: MEDICARE

## 2024-04-19 PROCEDURE — 97110 THERAPEUTIC EXERCISES: CPT | Performed by: PHYSICAL THERAPIST

## 2024-04-19 PROCEDURE — 97140 MANUAL THERAPY 1/> REGIONS: CPT | Performed by: PHYSICAL THERAPIST

## 2024-04-19 NOTE — PROGRESS NOTES
PHYSICAL / OCCUPATIONAL THERAPY - DAILY TREATMENT NOTE (updated )    Patient Name: Onel Livingston    Date: 2024    : 1960  Insurance: Payor: MEDICARE / Plan: MEDICARE PART A AND B / Product Type: *No Product type* /      Patient  verified Yes     Visit #   Current / Total 6 36   Time   In / Out 8:18am 918am   Pain   In / Out 2 0   Subjective Functional Status/Changes: It just feels so stiff today.    Changes to:  Meds, Allergies, Med Hx, Sx Hx?  If yes, update Summary List no       TREATMENT AREA =  Pain in left shoulder [M25.512]    OBJECTIVE    Modalities Rationale:     decrease edema, decrease inflammation, and decrease pain to improve patient's ability to progress to PLOF and address remaining functional goals.    10 min  unbill [x]  Ice     []  Heat    location/position: Sitting to L    Skin assessment post-treatment (if applicable):    []  intact    []  redness- no adverse reaction                 []redness - adverse reaction:          Therapeutic Procedures:  Tx Min Billable or 1:1 Min (if diff from Tx Min) Procedure, Rationale, Specifics   25  52579 Therapeutic Exercise (timed):  increase ROM, strength, coordination, balance, and proprioception to improve patient's ability to progress to PLOF and address remaining functional goals. (see flow sheet as applicable)     Details if applicable: see flow sheet.     25  72637 Manual Therapy (timed):  decrease pain, increase ROM, increase tissue extensibility, and decrease trigger points to improve patient's ability to progress to PLOF and address remaining functional goals.  The manual therapy interventions were performed at a separate and distinct time from the therapeutic activities interventions . (see flow sheet as applicable)   PROM left shoulder flex to 90 degs, ER to neutral; all with gentle left GHJ distraction, scapular mobes with DTM, pect release, oscillations   50  MC BC Totals Reminder: bill using total billable min of TIMED therapeutic

## 2024-04-22 ENCOUNTER — HOSPITAL ENCOUNTER (OUTPATIENT)
Facility: HOSPITAL | Age: 64
Setting detail: RECURRING SERIES
Discharge: HOME | End: 2024-04-25
Payer: MEDICARE

## 2024-04-22 PROCEDURE — 97140 MANUAL THERAPY 1/> REGIONS: CPT

## 2024-04-22 PROCEDURE — 97110 THERAPEUTIC EXERCISES: CPT

## 2024-04-22 NOTE — PROGRESS NOTES
deg  Long Term Goals: To be accomplished in 36 treatments  Pt will increase FOTO score to 56 points to improve ability to perform ADLs.  Status at last note/certification: 4 points  2.  Pt will improve AROM left shoulder flex/ABD to at least 120 degs to improve ability to overhead reaching when appropriate.  Status at last note/certification: currently PROM only   3.  Pt will increase AROM left shoulder ER/IR to 50 degs to improve ability to dress independently.  Status at last note/certification: PROM only.  4.  Pt will report being able to reach to a overhead shelf with minimal to no pain with the left UE to improve independence with daily   Status at last note/certification: unable due to protocol        PN due 5/1/24  Auth due 12/31/24 [facility]    PLAN  Yes  Continue plan of care  []  Upgrade activities as tolerated  []  Discharge due to :  []  Other:    Salome Boo, PT    4/22/2024    7:39 AM    Future Appointments   Date Time Provider Department Center   4/22/2024  8:20 AM Salome Boo, PT [facility]   4/26/2024  8:20 AM Marylin Wilson, PT [facility]   4/29/2024  8:20 AM Marylin Wilson, PT [facility]   5/3/2024  8:20 AM Jeferson Zapata, PTA [facility]

## 2024-04-24 ENCOUNTER — APPOINTMENT (OUTPATIENT)
Facility: HOSPITAL | Age: 64
End: 2024-04-24
Payer: MEDICARE

## 2024-04-26 ENCOUNTER — HOSPITAL ENCOUNTER (OUTPATIENT)
Facility: HOSPITAL | Age: 64
Setting detail: RECURRING SERIES
Discharge: HOME | End: 2024-04-29
Payer: MEDICARE

## 2024-04-26 PROCEDURE — 97140 MANUAL THERAPY 1/> REGIONS: CPT | Performed by: PHYSICAL THERAPIST

## 2024-04-26 PROCEDURE — 97110 THERAPEUTIC EXERCISES: CPT | Performed by: PHYSICAL THERAPIST

## 2024-04-26 NOTE — PROGRESS NOTES
PHYSICAL / OCCUPATIONAL THERAPY - DAILY TREATMENT NOTE (updated )    Patient Name: Onel Livingston    Date: 2024    : 1960  Insurance: Payor: MEDICARE / Plan: MEDICARE PART A AND B / Product Type: *No Product type* /      Patient  verified Yes     Visit #   Current / Total 9 36   Time   In / Out 822 am 930am   Pain   In / Out 2 0   Subjective Functional Status/Changes: \" I went back to doctor ([facility]) and I FU again in 2 weeks \"     TREATMENT AREA =  Pain in left shoulder [M25.512]    OBJECTIVE    Modalities Rationale:     decrease edema, decrease inflammation, and decrease pain to improve patient's ability to progress to PLOF and address remaining functional goals.    10  min  unbill [x]  Ice     []  Heat    location/position: Sitting to L    Skin assessment post-treatment (if applicable):    [x]  intact    []  redness- no adverse reaction                 []redness - adverse reaction:          Therapeutic Procedures:  Tx Min Billable or 1:1 Min (if diff from Tx Min) Procedure, Rationale, Specifics   38  78413 Therapeutic Exercise (timed):  increase ROM, strength, coordination, balance, and proprioception to improve patient's ability to progress to PLOF and address remaining functional goals. (see flow sheet as applicable)     Details if applicable:      20  51757 Manual Therapy (timed):  decrease pain, increase ROM, increase tissue extensibility, and decrease trigger points to improve patient's ability to progress to PLOF and address remaining functional goals.  The manual therapy interventions were performed at a separate and distinct time from the therapeutic activities interventions . (see flow sheet as applicable)   PROM left shoulder flex to 90 degs, ER to 30deg; all with gentle left GHJ distraction   58  MC BC Totals Reminder: bill using total billable min of TIMED therapeutic procedures (example: do not include dry needle or estim unattended, both untimed codes, in totals to left)  8-22 min = 1

## 2024-04-29 ENCOUNTER — HOSPITAL ENCOUNTER (OUTPATIENT)
Facility: HOSPITAL | Age: 64
Setting detail: RECURRING SERIES
Discharge: HOME | End: 2024-05-02
Payer: MEDICARE

## 2024-04-29 PROCEDURE — 97530 THERAPEUTIC ACTIVITIES: CPT | Performed by: PHYSICAL THERAPIST

## 2024-04-29 PROCEDURE — 97140 MANUAL THERAPY 1/> REGIONS: CPT | Performed by: PHYSICAL THERAPIST

## 2024-04-29 NOTE — PROGRESS NOTES
[facility]  [address], [address], [place] [address] Ph: [phone] Fx: [phone]  PHYSICAL THERAPY PROGRESS NOTE  Patient Name: Onel Livingston : 1960   Treatment/Medical Diagnosis: Pain in left shoulder [M25.512]   Referral Source: Tc Yarbrough DO     Date of Initial Visit: 24 Attended Visits: 10 Missed Visits: 0     SUMMARY OF TREATMENT  PROM, stretching, AAROM, therapeutic exercise, NM re-education, CP and manual therapy.    CURRENT STATUS  Patient has attended 10 therapy sessions and is making good progress within protocol parameters. Pt will be 5 weeks post-op on Friday 5/3/24. Pt has been out of sling for ~3 days.   Subjective: Patient reports 50% improvements in sx since SOC.  Pain levels range from 2 to 4.   Patient's current complaints: stiffness/tightness in wrist/elbow/shoulder,numbness/tingling in ulnar nerve distrubution.  Patient performing HEP all throughout day.  Objective: AAROM: flexion: 109 deg, scaption: 86 deg, ER: 25 deg    AROM: scaption: 85 deg  PROM: flexion: 110 deg, ER: 25deg  Functional gains: can wash better, grooming improved, dressing improved, out of sling  Functional Deficits: lifting  Patient goal: get better and less pain, improve ROM.  FOTO: 36/100    Progress toward goals / Updated goals:  []  See Progress Note/Recertification  Short Term Goals: To be accomplished in 12 treatments   Pt will report compliance and independence to HEP to help the pt manage their pain and symptoms.  Status at last note/certification:  verbally established  Current: reports compliance several times a day. 2024  2.     Pt will improve PROM left shoulder flex to 140 degs (when able per protocol) to improve progression through protocol and therapy interventions.   Status at last note/certification: to 90 degs today in supine  Current: 109 deg 29  3. Pt will increase FOTO score by 15 points to improve ability to 
109 deg  3. Pt will increase FOTO score by 15 points to improve ability to perform ADLs.  Status at last note/certification: 4 points total  Current: 36, MET  4/29/24  4. Pt will increase PROM left shoulder ER to 30 degs to improve ease of donning/doffing sling.  Status at last note/certification: neutral  ER:25deg, progressing  4/29/24  Long Term Goals: To be accomplished in 36 treatments  Pt will increase FOTO score to 56 points to improve ability to perform ADLs.  Status at last note/certification: 4 points  2.  Pt will improve AROM left shoulder flex/ABD to at least 120 degs to improve ability to overhead reaching when appropriate.  Status at last note/certification: currently PROM only   3.  Pt will increase AROM left shoulder ER/IR to 50 degs to improve ability to dress independently.  Status at last note/certification: PROM only.  4.  Pt will report being able to reach to a overhead shelf with minimal to no pain with the left UE to improve independence with daily   Status at last note/certification: unable due to protocol      Patient will continue to benefit from skilled PT / OT services to modify and progress therapeutic interventions, analyze and address functional mobility deficits, analyze and address ROM deficits, analyze and address strength deficits, analyze and address soft tissue restrictions, analyze and cue for proper movement patterns, analyze and modify for postural abnormalities, and instruct in home and community integration to address functional deficits and attain remaining goals.      PN due 5/1/24  Auth due 12/31/24 [facility]    PLAN  Yes  Continue plan of care  []  Upgrade activities as tolerated  []  Discharge due to :  [x]  Other:PN NV for 10th visit.     Marylin Wilson, PT    4/29/2024    7:50 AM    Future Appointments   Date Time Provider Department Center   4/29/2024  8:20 AM Marylin Wilson, PT [facility]   5/3/2024  8:20 AM Jeferson Zapata, PTA [facility]

## 2024-05-01 ENCOUNTER — APPOINTMENT (OUTPATIENT)
Facility: HOSPITAL | Age: 64
End: 2024-05-01
Payer: MEDICARE

## 2024-05-03 ENCOUNTER — HOSPITAL ENCOUNTER (OUTPATIENT)
Facility: HOSPITAL | Age: 64
Setting detail: RECURRING SERIES
Discharge: HOME | End: 2024-05-06
Payer: MEDICARE

## 2024-05-03 PROCEDURE — 97140 MANUAL THERAPY 1/> REGIONS: CPT

## 2024-05-03 PROCEDURE — 97110 THERAPEUTIC EXERCISES: CPT

## 2024-05-03 PROCEDURE — 97112 NEUROMUSCULAR REEDUCATION: CPT

## 2024-05-03 NOTE — PROGRESS NOTES
functional goals.  The manual therapy interventions were performed at a separate and distinct time from the therapeutic activities interventions . (see flow sheet as applicable)   MFR and TPR to (L) infra and (L) pec.   40 40 [facility] BC Totals Reminder: bill using total billable min of TIMED therapeutic procedures (example: do not include dry needle or estim unattended, both untimed codes, in totals to left)  8-22 min = 1 unit; 23-37 min = 2 units; 38-52 min = 3 units; 53-67 min = 4 units; 68-82 min = 5 units   Total Total       [x]  Patient Education billed concurrently with other procedures   [x] Review HEP    [] Progressed/Changed HEP, detail:    [] Other detail:       Objective Information/Functional Measures/Assessment:    Initiate iso x 6 5x5 seconds each. Vcing given to relax UT and to maintain scap retraction with all therx.    Patient instructed to maintain decrease shoulder shrug with pulleys and cued to decrease motion to 90 degrees.    TTP of infraspinatus and pec.  Significant for increase tightness and TP in both areas.    Progress toward goals / Updated goals:  []  See Progress Note/Recertification    Short Term Goals: To be accomplished in 12 treatments   Pt will report compliance and independence to HEP to help the pt manage their pain and symptoms.  Status at last note/certification:  verbally established  Current: reports compliance several times a day. 4/29/2024  2.     Pt will improve PROM left shoulder flex to 140 degs (when able per protocol) to improve progression through protocol and therapy interventions.   Status at last note/certification: to 90 degs today in supine  Current: 109 deg 4/29/29  3. Pt will increase FOTO score by 15 points to improve ability to perform ADLs.  Status at last note/certification: 4 points total  Current: 36, MET  4/29/24  4. Pt will increase PROM left shoulder ER to 30 degs to improve ease of donning/doffing sling.  Status at last note/certification:

## 2024-05-06 ENCOUNTER — HOSPITAL ENCOUNTER (OUTPATIENT)
Facility: HOSPITAL | Age: 64
Setting detail: RECURRING SERIES
Discharge: HOME | End: 2024-05-09
Payer: MEDICARE

## 2024-05-06 PROCEDURE — 97110 THERAPEUTIC EXERCISES: CPT | Performed by: PHYSICAL THERAPIST

## 2024-05-06 PROCEDURE — 97140 MANUAL THERAPY 1/> REGIONS: CPT | Performed by: PHYSICAL THERAPIST

## 2024-05-06 PROCEDURE — 97112 NEUROMUSCULAR REEDUCATION: CPT | Performed by: PHYSICAL THERAPIST

## 2024-05-10 ENCOUNTER — HOSPITAL ENCOUNTER (OUTPATIENT)
Facility: HOSPITAL | Age: 64
Setting detail: RECURRING SERIES
Discharge: HOME | End: 2024-05-13
Payer: MEDICARE

## 2024-05-10 PROCEDURE — 97110 THERAPEUTIC EXERCISES: CPT

## 2024-05-10 PROCEDURE — 97112 NEUROMUSCULAR REEDUCATION: CPT

## 2024-05-10 PROCEDURE — 97140 MANUAL THERAPY 1/> REGIONS: CPT

## 2024-05-10 NOTE — PROGRESS NOTES
PHYSICAL / OCCUPATIONAL THERAPY - DAILY TREATMENT NOTE (updated )    Patient Name: Onel Livingston    Date: 5/10/2024    : 1960  Insurance: Payor: MEDICARE / Plan: MEDICARE PART A AND B / Product Type: *No Product type* /      Patient  verified Yes     Visit #   Current / Total 13 36   Time   In / Out 820 910   Pain   In / Out 3 3   Subjective Functional Status/Changes: Feels like the improvements are getting more everyday.     TREATMENT AREA =  Pain in left shoulder [M25.512]    OBJECTIVE    Modalities Rationale:     decrease edema, decrease inflammation, and decrease pain to improve patient's ability to progress to PLOF and address remaining functional goals.    10 min  unbill [x]  Ice     []  Heat    location/position: Sitting to L    Skin assessment post-treatment (if applicable):    [x]  intact    []  redness- no adverse reaction                 []redness - adverse reaction:          Therapeutic Procedures:  Tx Min Billable or 1:1 Min (if diff from Tx Min) Procedure, Rationale, Specifics   15 15 26386 Therapeutic Exercise (timed):  increase ROM, strength, coordination, balance, and proprioception to improve patient's ability to progress to PLOF and address remaining functional goals. (see flow sheet as applicable)     Details if applicable:      10 10 60083 Neuromuscular Re-Education (timed):  improve balance, coordination, kinesthetic sense, posture, core stability and proprioception to improve patient's ability to develop conscious control of individual muscles and awareness of position of extremities in order to progress to PLOF and address remaining functional goals. (see flow sheet as applicable)        Details:      15 15 49798 Manual Therapy (timed):  decrease pain, increase ROM, increase tissue extensibility, and decrease trigger points to improve patient's ability to progress to PLOF and address remaining functional goals.  The manual therapy interventions were performed at a separate and

## 2024-05-13 ENCOUNTER — HOSPITAL ENCOUNTER (OUTPATIENT)
Facility: HOSPITAL | Age: 64
Setting detail: RECURRING SERIES
Discharge: HOME | End: 2024-05-16
Payer: MEDICARE

## 2024-05-13 PROCEDURE — 97140 MANUAL THERAPY 1/> REGIONS: CPT

## 2024-05-13 PROCEDURE — 97112 NEUROMUSCULAR REEDUCATION: CPT

## 2024-05-13 PROCEDURE — 97110 THERAPEUTIC EXERCISES: CPT

## 2024-05-13 NOTE — PROGRESS NOTES
PHYSICAL / OCCUPATIONAL THERAPY - DAILY TREATMENT NOTE (updated )    Patient Name: Onel Livingston    Date: 2024    : 1960  Insurance: Payor: MEDICARE / Plan: MEDICARE PART A AND B / Product Type: *No Product type* /      Patient  verified Yes     Visit #   Current / Total 14 36   Time   In / Out 220 310   Pain   In / Out 3 3   Subjective Functional Status/Changes: I was good until I was putting my jacket on and gave a good stretch.     TREATMENT AREA =  Pain in left shoulder [M25.512]    OBJECTIVE    Modalities Rationale:     decrease edema, decrease inflammation, and decrease pain to improve patient's ability to progress to PLOF and address remaining functional goals.    10 min  unbill [x]  Ice     []  Heat    location/position: Sitting to L    Skin assessment post-treatment (if applicable):    [x]  intact    []  redness- no adverse reaction                 []redness - adverse reaction:          Therapeutic Procedures:  Tx Min Billable or 1:1 Min (if diff from Tx Min) Procedure, Rationale, Specifics   15 15 01878 Therapeutic Exercise (timed):  increase ROM, strength, coordination, balance, and proprioception to improve patient's ability to progress to PLOF and address remaining functional goals. (see flow sheet as applicable)     Details if applicable:      10 10 64421 Neuromuscular Re-Education (timed):  improve balance, coordination, kinesthetic sense, posture, core stability and proprioception to improve patient's ability to develop conscious control of individual muscles and awareness of position of extremities in order to progress to PLOF and address remaining functional goals. (see flow sheet as applicable)        Details:      15 15 30271 Manual Therapy (timed):  decrease pain, increase ROM, increase tissue extensibility, and decrease trigger points to improve patient's ability to progress to PLOF and address remaining functional goals.  The manual therapy interventions were performed at a

## 2024-05-17 ENCOUNTER — HOSPITAL ENCOUNTER (OUTPATIENT)
Facility: HOSPITAL | Age: 64
Setting detail: RECURRING SERIES
Discharge: HOME | End: 2024-05-20
Payer: MEDICARE

## 2024-05-17 PROCEDURE — 97112 NEUROMUSCULAR REEDUCATION: CPT

## 2024-05-17 PROCEDURE — 97110 THERAPEUTIC EXERCISES: CPT

## 2024-05-17 PROCEDURE — 97140 MANUAL THERAPY 1/> REGIONS: CPT

## 2024-05-17 NOTE — PROGRESS NOTES
ADLs.  Status at last note/certification: 4 points  2.  Pt will improve AROM left shoulder flex/ABD to at least 120 degs to improve ability to overhead reaching when appropriate.  Status at last note/certification: currently PROM only   3.  Pt will increase AROM left shoulder ER/IR to 50 degs to improve ability to dress independently.  Status at last note/certification: PROM only.  4.  Pt will report being able to reach to a overhead shelf with minimal to no pain with the left UE to improve independence with daily   Status at last note/certification: unable due to protocol     Patient will continue to benefit from skilled PT / OT services to modify and progress therapeutic interventions, analyze and address functional mobility deficits, analyze and address ROM deficits, analyze and address strength deficits, analyze and address soft tissue restrictions, analyze and cue for proper movement patterns, analyze and modify for postural abnormalities, and instruct in home and community integration to address functional deficits and attain remaining goals.      PN due 6/1/24  Auth due 12/31/24 [facility]    PLAN  Yes  Continue plan of care  []  Upgrade activities as tolerated  []  Discharge due to :  [x]  Other:PN NV for 10th visit.     Rico Cha PT    5/17/2024    8:40 AM    Future Appointments   Date Time Provider Department Center   5/17/2024  9:40 AM Rico Cha, PT [facility]   5/20/2024 12:20 PM Praneeth Blancas, PT [facility]   5/24/2024 10:20 AM Marylin Wilson, PT [facility]   6/3/2024  8:20 AM Jeferson Zapata, PTA [facility]   6/7/2024  8:20 AM Jeferson Zapata, PTA [facility]   6/10/2024  8:20 AM Jeferson Zapata, PTA [facility]   6/14/2024  8:20 AM Jeferson Zapata, PTA [facility]   6/17/2024  9:00 AM Jeferson Zapata, PTA [facility]   6/21/2024  8:20 AM Jeferson Zapata, PTA [facility]   6/24/2024  8:20 AM Marylin Wilson, PT [facility]   6/28/2024  8:20 AM Jeferson Zapata, PTA [facility]

## 2024-05-20 ENCOUNTER — HOSPITAL ENCOUNTER (OUTPATIENT)
Facility: HOSPITAL | Age: 64
Setting detail: RECURRING SERIES
Discharge: HOME | End: 2024-05-23
Payer: MEDICARE

## 2024-05-20 PROCEDURE — 97110 THERAPEUTIC EXERCISES: CPT | Performed by: PHYSICAL THERAPIST

## 2024-05-20 PROCEDURE — 97112 NEUROMUSCULAR REEDUCATION: CPT | Performed by: PHYSICAL THERAPIST

## 2024-05-20 PROCEDURE — 97140 MANUAL THERAPY 1/> REGIONS: CPT | Performed by: PHYSICAL THERAPIST

## 2024-05-20 NOTE — PROGRESS NOTES
PHYSICAL / OCCUPATIONAL THERAPY - DAILY TREATMENT NOTE (updated )    Patient Name: Onel Livingston    Date: 2024    : 1960  Insurance: Payor: MEDICARE / Plan: MEDICARE PART A AND B / Product Type: *No Product type* /      Patient  verified Yes     Visit #   Current / Total 16 36   Time   In / Out 818 am  920am   Pain   In / Out 1 0-1   Subjective Functional Status/Changes: \" I was able to sleep a couple of hours on the L shoulder \"     TREATMENT AREA =  Pain in left shoulder [M25.512]    OBJECTIVE    Modalities Rationale:     decrease edema, decrease inflammation, and decrease pain to improve patient's ability to progress to PLOF and address remaining functional goals.    10 min  unbill [x]  Ice     []  Heat    location/position: Sitting to L    Skin assessment post-treatment (if applicable):    [x]  intact    []  redness- no adverse reaction                 []redness - adverse reaction:          Therapeutic Procedures:  Tx Min Billable or 1:1 Min (if diff from Tx Min) Procedure, Rationale, Specifics   22  65094 Therapeutic Exercise (timed):  increase ROM, strength, coordination, balance, and proprioception to improve patient's ability to progress to PLOF and address remaining functional goals. (see flow sheet as applicable)     Details if applicable:      15  28449 Neuromuscular Re-Education (timed):  improve balance, coordination, kinesthetic sense, posture, core stability and proprioception to improve patient's ability to develop conscious control of individual muscles and awareness of position of extremities in order to progress to PLOF and address remaining functional goals. (see flow sheet as applicable)        Details:      15  88346 Manual Therapy (timed):  decrease pain, increase ROM, increase tissue extensibility, and decrease trigger points to improve patient's ability to progress to PLOF and address remaining functional goals.  The manual therapy interventions were performed at a separate

## 2024-05-24 ENCOUNTER — APPOINTMENT (OUTPATIENT)
Facility: HOSPITAL | Age: 64
End: 2024-05-24
Payer: MEDICARE

## 2024-05-28 ENCOUNTER — HOSPITAL ENCOUNTER (OUTPATIENT)
Facility: HOSPITAL | Age: 64
Setting detail: RECURRING SERIES
Discharge: HOME | End: 2024-05-31
Payer: MEDICARE

## 2024-05-28 PROCEDURE — 97535 SELF CARE MNGMENT TRAINING: CPT

## 2024-05-28 PROCEDURE — 97140 MANUAL THERAPY 1/> REGIONS: CPT

## 2024-05-28 NOTE — PROGRESS NOTES
PHYSICAL / OCCUPATIONAL THERAPY - DAILY TREATMENT NOTE (updated )    Patient Name: Onel Livingston    Date: 2024    : 1960  Insurance: Payor: MEDICARE / Plan: MEDICARE PART A AND B / Product Type: *No Product type* /      Patient  verified Yes     Visit #   Current / Total 17 36   Time   In / Out 820 910   Pain   In / Out 0 1   Subjective Functional Status/Changes: Just tight, the isometrics kick my ass, their good exercises.     TREATMENT AREA =  Pain in left shoulder [M25.512]    OBJECTIVE    Modalities Rationale:     decrease edema, decrease inflammation, and decrease pain to improve patient's ability to progress to PLOF and address remaining functional goals.    10 min  unbill [x]  Ice     []  Heat    location/position: Sitting to L    Skin assessment post-treatment (if applicable):    [x]  intact    []  redness- no adverse reaction                 []redness - adverse reaction:          Therapeutic Procedures:  Tx Min Billable or 1:1 Min (if diff from Tx Min) Procedure, Rationale, Specifics     17516 Therapeutic Exercise (timed):  increase ROM, strength, coordination, balance, and proprioception to improve patient's ability to progress to PLOF and address remaining functional goals. (see flow sheet as applicable)     Details if applicable:      25  43361 Self Care/Home Management (timed):  improve patient knowledge and understanding of activity modification and aassess goals and function  to improve patient's ability to progress to PLOF and address remaining functional goals.  (see flow sheet as applicable)      Details:    15  36842 Manual Therapy (timed):  decrease pain, increase ROM, increase tissue extensibility, and decrease trigger points to improve patient's ability to progress to PLOF and address remaining functional goals.  The manual therapy interventions were performed at a separate and distinct time from the therapeutic activities interventions . (see flow sheet as applicable)   DOMINIK

## 2024-05-31 ENCOUNTER — HOSPITAL ENCOUNTER (OUTPATIENT)
Facility: HOSPITAL | Age: 64
Setting detail: RECURRING SERIES
End: 2024-05-31
Payer: MEDICARE

## 2024-05-31 PROCEDURE — 97530 THERAPEUTIC ACTIVITIES: CPT | Performed by: PHYSICAL THERAPIST

## 2024-05-31 PROCEDURE — 97110 THERAPEUTIC EXERCISES: CPT | Performed by: PHYSICAL THERAPIST

## 2024-05-31 PROCEDURE — 97140 MANUAL THERAPY 1/> REGIONS: CPT | Performed by: PHYSICAL THERAPIST

## 2024-05-31 NOTE — PROGRESS NOTES
PHYSICAL / OCCUPATIONAL THERAPY - DAILY TREATMENT NOTE (updated )    Patient Name: Onel Livingston    Date: 2024    : 1960  Insurance: Payor: MEDICARE / Plan: MEDICARE PART A AND B / Product Type: *No Product type* /      Patient  verified Yes     Visit #   Current / Total 18 36   Time   In / Out 900am 959am   Pain   In / Out 1-2 1   Subjective Functional Status/Changes: I have been doing more at home and in the gym.     TREATMENT AREA =  Pain in left shoulder [M25.512]    OBJECTIVE    Modalities Rationale:     decrease edema, decrease inflammation, and decrease pain to improve patient's ability to progress to PLOF and address remaining functional goals.    10 min  unbill [x]  Ice     []  Heat    location/position: Sitting to L    Skin assessment post-treatment (if applicable):    [x]  intact    []  redness- no adverse reaction                 []redness - adverse reaction:          Therapeutic Procedures:  Tx Min Billable or 1:1 Min (if diff from Tx Min) Procedure, Rationale, Specifics   18  52542 Therapeutic Exercise (timed):  increase ROM, strength, coordination, balance, and proprioception to improve patient's ability to progress to PLOF and address remaining functional goals. (see flow sheet as applicable)     Details if applicable:      15  19510 Therapeutic Activity (timed):  use of dynamic activities replicating functional movements to increase ROM, strength, coordination, balance, and proprioception in order to improve patient's ability to progress to PLOF and address remaining functional goals.  (see flow sheet as applicable)      Details:    15  86267 Manual Therapy (timed):  decrease pain, increase ROM, increase tissue extensibility, and decrease trigger points to improve patient's ability to progress to PLOF and address remaining functional goals.  The manual therapy interventions were performed at a separate and distinct time from the therapeutic activities interventions . (see flow sheet  as applicable)   MFR (L) pec, PROM into flexion, ABD, ER   49  [facility] Totals Reminder: bill using total billable min of TIMED therapeutic procedures (example: do not include dry needle or estim unattended, both untimed codes, in totals to left)  8-22 min = 1 unit; 23-37 min = 2 units; 38-52 min = 3 units; 53-67 min = 4 units; 68-82 min = 5 units   Total Total       [x]  Patient Education billed concurrently with other procedures   [x] Review HEP    [] Progressed/Changed HEP, detail:    [] Other detail:       Objective Information/Functional Measures/Assessment:    Date of Surgery 3/29/24   [x]6 weeks  5/10/24   [x]8 weeks 5/24/24   []12 weeks 6/21/24   []16 weeks 7/19/24     Focus on post deltoid strength today with newly added TB face pulls (high TB mount) with wide wrists in \"W\" to incorporate ER and extension to neutral or just past body. Pt challenged due to ER ROM, performed same W in half prone with improved form.  Progressed Biceps/triceps  to 5#  Pt demonstrated isometric for form in all 6 directions, as he performs theses activities in gym.       Progress toward goals / Updated goals:  []  See Progress Note/Recertification    Short Term Goals: To be accomplished in 12 treatments   Pt will report compliance and independence to HEP to help the pt manage their pain and symptoms.  Status at last note/certification:  verbally established  Current: reports compliance several times a day. 4/29/2024  2.     Pt will improve PROM left shoulder flex to 140 degs (when able per protocol) to improve progression through protocol and therapy interventions.   Status at last note/certification: to 90 degs today in supine  Current: ~110 deg today 5/17/24  3. Pt will increase FOTO score by 15 points to improve ability to perform ADLs.  Status at last note/certification: 4 points total  Current: 36, MET  4/29/24  4. Pt will increase PROM left shoulder ER to 30 degs to improve ease of donning/doffing sling.  Status at last

## 2024-06-03 ENCOUNTER — HOSPITAL ENCOUNTER (OUTPATIENT)
Facility: HOSPITAL | Age: 64
Setting detail: RECURRING SERIES
Discharge: HOME | End: 2024-06-06
Payer: MEDICARE

## 2024-06-03 PROCEDURE — 97140 MANUAL THERAPY 1/> REGIONS: CPT

## 2024-06-03 PROCEDURE — 97110 THERAPEUTIC EXERCISES: CPT

## 2024-06-03 NOTE — PROGRESS NOTES
units; 53-67 min = 4 units; 68-82 min = 5 units   Total Total       [x]  Patient Education billed concurrently with other procedures   [x] Review HEP    [] Progressed/Changed HEP, detail:    [] Other detail:       Objective Information/Functional Measures/Assessment:    Date of Surgery 3/29/24   [x]6 weeks  5/10/24   [x]8 weeks 5/24/24   []12 weeks 6/21/24   []16 weeks 7/19/24     Demonstrating increase shoulder flexion in standing, IR AROM to sacrum.    Overall good tolerance to all therx and manual today.    Progress toward goals / Updated goals:  []  See Progress Note/Recertification    Short Term Goals: To be accomplished in 12 treatments   Pt will report compliance and independence to HEP to help the pt manage their pain and symptoms.  Status at last note/certification:  verbally established  Current Status: patient reports compliance with present HEP  Goal Met?  yes     2.     Pt will improve PROM left shoulder flex to 140 degs (when able per protocol) to improve progression through protocol and therapy interventions.   Status at last note/certification: to 90 degs today in supine  Current Status: Flexion: 132  Goal Met?  progressing     3. Pt will increase PROM left shoulder ER to 30 degs to improve ease of donning/doffing sling.  Status at last note/certification: neutral  Current Status: ER @ 90: 50  Goal Met?  yes     Long Term Goals: To be accomplished in 36 treatments  Pt will increase FOTO score to 56 points to improve ability to perform ADLs.  Status at last note/certification: 4 points  Current Status: FOTO: 48  Goal Met?  progressing     2.  Pt will improve AROM left shoulder flex/ABD to at least 120 degs to improve ability to overhead reaching when appropriate.  Status at last note/certification: currently PROM only   Current Status: Flexion: 124  Goal Met?  yes     3.  Pt will increase AROM left shoulder ER/IR to 50 degs to improve ability to dress independently.  Status at last note/certification:

## 2024-06-07 ENCOUNTER — HOSPITAL ENCOUNTER (OUTPATIENT)
Facility: HOSPITAL | Age: 64
Setting detail: RECURRING SERIES
Discharge: HOME | End: 2024-06-10
Payer: MEDICARE

## 2024-06-07 PROCEDURE — 97112 NEUROMUSCULAR REEDUCATION: CPT

## 2024-06-07 PROCEDURE — 97140 MANUAL THERAPY 1/> REGIONS: CPT

## 2024-06-07 PROCEDURE — 97110 THERAPEUTIC EXERCISES: CPT

## 2024-06-07 NOTE — PROGRESS NOTES
to improve ability to perform ADLs.  Status at last note/certification: 4 points  Current Status: FOTO: 48  Goal Met?  progressing     2.  Pt will improve AROM left shoulder flex/ABD to at least 120 degs to improve ability to overhead reaching when appropriate.  Status at last note/certification: currently PROM only   Current Status: Flexion: 124  Goal Met?  yes     3.  Pt will increase AROM left shoulder ER/IR to 50 degs to improve ability to dress independently.  Status at last note/certification: PROM only.  Current Status: ER: top of head.  Goal Met?  progressing     4.  Pt will report being able to reach to a overhead shelf with minimal to no pain with the left UE to improve independence with daily   Status at last note/certification: unable due to protocol   Current Status: continues with difficulty and unable at this time.  Goal Met?  progressing    Patient will continue to benefit from skilled PT / OT services to modify and progress therapeutic interventions, analyze and address functional mobility deficits, analyze and address ROM deficits, analyze and address strength deficits, analyze and address soft tissue restrictions, analyze and cue for proper movement patterns, analyze and modify for postural abnormalities, and instruct in home and community integration to address functional deficits and attain remaining goals.      PN due 7/1/24  Auth due 12/31/24 medne    PLAN  Yes  Continue plan of care  []  Upgrade activities as tolerated  []  Discharge due to :  [x]  Other:PN NV for 10th visit.     Jeferson Zapata PTA    6/7/2024    6:11 AM    Future Appointments   Date Time Provider Department Center   6/7/2024  8:20 AM Jeferson Zapata PTA [facility]   6/10/2024  8:20 AM Jeferson Zapata PTA [facility]   6/14/2024  8:20 AM Jeferson Zapata PTA [facility]   6/17/2024  9:00 AM Jeferson Zapata PTA [facility]   6/21/2024  8:20 AM Jeferson Zapata PTA [facility]   6/24/2024  8:20 AM Marylin Wilson, PT

## 2024-06-10 ENCOUNTER — HOSPITAL ENCOUNTER (OUTPATIENT)
Facility: HOSPITAL | Age: 64
Setting detail: RECURRING SERIES
Discharge: HOME | End: 2024-06-13
Payer: MEDICARE

## 2024-06-10 PROCEDURE — 97112 NEUROMUSCULAR REEDUCATION: CPT

## 2024-06-10 PROCEDURE — 97110 THERAPEUTIC EXERCISES: CPT

## 2024-06-10 PROCEDURE — 97140 MANUAL THERAPY 1/> REGIONS: CPT

## 2024-06-10 NOTE — PROGRESS NOTES
PHYSICAL / OCCUPATIONAL THERAPY - DAILY TREATMENT NOTE (updated )    Patient Name: Onel Livingston    Date: 6/10/2024    : 1960  Insurance: Payor: MEDICARE / Plan: MEDICARE PART A AND B / Product Type: *No Product type* /      Patient  verified Yes     Visit #   Current / Total 21 36   Time   In / Out 820 910   Pain   In / Out 1 1   Subjective Functional Status/Changes: My arm did well at the concert.     TREATMENT AREA =  Pain in left shoulder [M25.512]    OBJECTIVE    Modalities Rationale:     decrease edema, decrease inflammation, and decrease pain to improve patient's ability to progress to PLOF and address remaining functional goals.    10 min  unbill [x]  Ice     []  Heat    location/position: Sitting to L    Skin assessment post-treatment (if applicable):    [x]  intact    []  redness- no adverse reaction                 []redness - adverse reaction:          Therapeutic Procedures:  Tx Min Billable or 1:1 Min (if diff from Tx Min) Procedure, Rationale, Specifics   15 15 96815 Therapeutic Exercise (timed):  increase ROM, strength, coordination, balance, and proprioception to improve patient's ability to progress to PLOF and address remaining functional goals. (see flow sheet as applicable)     Details if applicable:      10 10 05517 Neuromuscular Re-Education (timed):  improve balance, coordination, kinesthetic sense, posture, core stability and proprioception to improve patient's ability to develop conscious control of individual muscles and awareness of position of extremities in order to progress to PLOF and address remaining functional goals. (see flow sheet as applicable)      15 15 21819 Manual Therapy (timed):  decrease pain, increase ROM, increase tissue extensibility, and decrease trigger points to improve patient's ability to progress to PLOF and address remaining functional goals.  The manual therapy interventions were performed at a separate and distinct time from the therapeutic

## 2024-06-14 ENCOUNTER — APPOINTMENT (OUTPATIENT)
Facility: HOSPITAL | Age: 64
End: 2024-06-14
Payer: MEDICARE

## 2024-06-17 ENCOUNTER — APPOINTMENT (OUTPATIENT)
Facility: HOSPITAL | Age: 64
End: 2024-06-17
Payer: MEDICARE

## 2024-06-21 ENCOUNTER — APPOINTMENT (OUTPATIENT)
Facility: HOSPITAL | Age: 64
End: 2024-06-21
Payer: MEDICARE

## 2024-06-24 ENCOUNTER — HOSPITAL ENCOUNTER (OUTPATIENT)
Facility: HOSPITAL | Age: 64
Setting detail: RECURRING SERIES
End: 2024-06-24
Payer: MEDICARE

## 2024-06-28 ENCOUNTER — HOSPITAL ENCOUNTER (OUTPATIENT)
Facility: HOSPITAL | Age: 64
Setting detail: RECURRING SERIES
Discharge: HOME | End: 2024-07-01
Payer: MEDICARE

## 2024-06-28 PROCEDURE — 97530 THERAPEUTIC ACTIVITIES: CPT

## 2024-06-28 PROCEDURE — 97112 NEUROMUSCULAR REEDUCATION: CPT

## 2024-06-28 PROCEDURE — 97110 THERAPEUTIC EXERCISES: CPT

## 2024-06-28 NOTE — THERAPY DISCHARGE
[facility]  [address], [address], [place] [address] Ph: [phone] Fx: [phone]   DISCHARGE SUMMARY  Patient Name: Onel Livingston : 1960   Treatment/Medical Diagnosis: Pain in left shoulder [M25.512]   Referral Source: Tc Yarbrough DO     Date of Initial Visit: 24 Attended Visits: 22 Missed Visits: 0     SUMMARY OF TREATMENT  Pt seen in clinic for to address Pain in left shoulder [M25.512]. Pt has been assessed, completed therapeutic exercises, neuromuscular re-ed, therapeutic functional activity, received manual therapy intervention, self-care strategies, HEP techniques and pt ed on condition consistency and follow-through.     CURRENT STATUS  Patient has been progressing well per protocol and recently progressed past isometric activities. However, last week in the clinic patient reported calf pain and went to the ER and was told he had 4-5 DVTs, two days later he was having chest pain and went to the ER and had a PE. He is currently in Afib and waiting to see if medication will help otherwise will undergo procedure to reverse. Patient has been cleared by Ortho and cardio to return to PT. Patient feels comfortable and confident in current HEP and would like to D/C from PT. Reviewed AROM and strengthening progressions allowing with continued precautions; patient independent in demonstration.    1.Pt will increase FOTO score to 56 points to improve ability to perform ADLs.  Status at last note/certification: 48 points  Current Status: FOTO: 68  Goal Met?  Yes     2.  Pt will improve AROM left shoulder flex/ABD to at least 120 degs to improve ability to overhead reaching when appropriate.  Status at last note/certification: currently PROM only   Current Status: Flexion: 120-124, Abd 110  Goal Met?  yes     3.  Pt will increase AROM left shoulder ER/IR to 50 degs to improve ability to dress independently.  Status at last

## 2024-06-28 NOTE — PROGRESS NOTES
PHYSICAL / OCCUPATIONAL THERAPY - DAILY TREATMENT NOTE (updated )    Patient Name: Onel Livingston    Date: 2024    : 1960  Insurance: Payor: MEDICARE / Plan: MEDICARE PART A AND B / Product Type: *No Product type* /      Patient  verified Yes     Visit #   Current / Total 22 36   Time   In / Out 820 900   Pain   In / Out 0-1 0-1   Subjective Functional Status/Changes: See D/C summary     TREATMENT AREA =  Pain in left shoulder [M25.512]    OBJECTIVE    Therapeutic Procedures:  Tx Min Billable or 1:1 Min (if diff from Tx Min) Procedure, Rationale, Specifics   15  15138 Therapeutic Exercise (timed):  increase ROM, strength, coordination, balance, and proprioception to improve patient's ability to progress to PLOF and address remaining functional goals. (see flow sheet as applicable)     Details if applicable:      10  26011 Neuromuscular Re-Education (timed):  improve balance, coordination, kinesthetic sense, posture, core stability and proprioception to improve patient's ability to develop conscious control of individual muscles and awareness of position of extremities in order to progress to PLOF and address remaining functional goals. (see flow sheet as applicable)      15  85174 Therapeutic Activity (timed):  use of dynamic activities replicating functional movements to increase ROM, strength, coordination, balance, and proprioception in order to improve patient's ability to progress to PLOF and address remaining functional goals.  (see flow sheet as applicable)      40  [facility] Totals Reminder: bill using total billable min of TIMED therapeutic procedures (example: do not include dry needle or estim unattended, both untimed codes, in totals to left)  8-22 min = 1 unit; 23-37 min = 2 units; 38-52 min = 3 units; 53-67 min = 4 units; 68-82 min = 5 units   Total Total       [x]  Patient Education billed concurrently with other procedures   [x] Review HEP    [] Progressed/Changed HEP, detail:    [] Other

## 2025-03-20 ENCOUNTER — HOSPITAL ENCOUNTER (OUTPATIENT)
Facility: HOSPITAL | Age: 65
Setting detail: RECURRING SERIES
Discharge: HOME | End: 2025-03-23
Payer: MEDICARE

## 2025-03-20 PROCEDURE — 97110 THERAPEUTIC EXERCISES: CPT

## 2025-03-20 PROCEDURE — 97162 PT EVAL MOD COMPLEX 30 MIN: CPT

## 2025-03-20 PROCEDURE — 97530 THERAPEUTIC ACTIVITIES: CPT

## 2025-03-20 NOTE — PROGRESS NOTES
PHYSICAL / OCCUPATIONAL THERAPY - DAILY TREATMENT NOTE (updated )  For Eval visit    Patient Name: Onel Livingston    Date: 3/20/2025    : 1960  Insurance: Payor: MEDICARE / Plan: MEDICARE PART A AND B / Product Type: *No Product type* /      Patient  verified yes     Visit #   Current / Total 1 12-24   Time   In / Out 820 900   Pain   In / Out 3 3   Subjective Functional Status/Changes: See POC     TREATMENT AREA =  Left shoulder pain    OBJECTIVE    Modalities Rationale:    PD   min [] Estim Unattended, type/location:                                      []  w/ice    []  w/heat    min [] Estim Attended, type/location:                                     []  w/US     []  w/ice    []  w/heat    []  TENS insruct      min []  Mechanical Traction: type/lbs                   []  pro   []  sup   []  int   []  cont    []  before manual    []  after manual    min []  Ultrasound, settings/location:      min  unbill []  Ice     []  Heat    location/position:     min []  Paraffin,  details:     min []  Vasopneumatic Device, press/temp:     min []  Whirlpool / Fluido:    If using vaso (only need to measure limb vaso being performed on)      pre-treatment girth :       post-treatment girth :       measured at (landmark location) :      min []  Other:            15 min   Eval - untimed                      Therapeutic Procedures:  Tx Min Billable or 1:1 Min (if diff from Tx Min) Procedure, Rationale, Specifics   82 70095 Therapeutic Exercise (timed):  increase ROM, strength, coordination, balance, and proprioception to improve patient's ability to progress to PLOF and address remaining functional goals. (see flow sheet as applicable)     Details if applicable:     10  91707 Therapeutic Activity (timed):  use of dynamic activities replicating functional movements to increase ROM, strength, coordination, balance, and proprioception in order to improve patient's ability to progress to PLOF and address remaining

## 2025-03-20 NOTE — THERAPY EVALUATION
[facility]  [address], [address], [place] [address] Ph: [phone] Fx: [phone]  Plan of Care / Statement of Necessity for Physical Therapy Services     Patient Name: Onel Livingston : 1960   Medical   Diagnosis: Left shoulder pain Treatment Diagnosis: M25.512  LEFT SHOULDER PAIN    Onset Date: 10/1/24     Referral Source: Tc Yarbrough DO Start of Care (SOC): 3/20/2025   Prior Hospitalization: See medical history Provider #: [id number]   Prior Level of Function: Independent use with the UE   Comorbidities: Other: TB, Cardiac issues, Cancer     Assessment / key information: Patient is a 64 y.o.  yo male who presents to Physical Therapy at  [facility] with Dx of Left shoulder pain.  Patient had Left total shoulder in March of last year. He had to stop therapy in May/ secondary to multiple DVT's and cardiac issues. He c/o weakness and pain in the left shoulder. He currently rates his pain as 6/10 at worst, 3/10 at best, primarily located in the anterior and posterior shoulder along with the joint itself.  He complains of difficulty and increased pain with reaching forward and up.    Objective Findings:    Posture: [] Poor    [x] Fair    [] Good    Describe: forward head, mild rounded shoulders; Notable loss of definition on left of biceps                                AROM                                                              PROM   Left Right  Left Right   Flexion 70 145 Flexion 125    Extension 53 75 Extension     Scaption/ABD 50 150 Scaptin/    ER @ 0 Degrees occiput C8 ER @ 0 Degrees     ER @ 90 Degrees   ER @ 45 Degrees 40    IR judy from C8 56 cm 42 cm IR @ 45 Degrees 70      End Feel / Painful Arc:    Strength:  Grossly 3-/5 secondary to pain    Scapulohumoral Control / Rhythm:  Able to eccentrically lower with good control? Left: [] Yes   [x] No     Right: [x] Yes   [] No    Accessory Motions:    Palpation  [] Min  [x]

## 2025-03-28 ENCOUNTER — HOSPITAL ENCOUNTER (OUTPATIENT)
Facility: HOSPITAL | Age: 65
Setting detail: RECURRING SERIES
Discharge: HOME | End: 2025-03-31
Payer: MEDICARE

## 2025-03-28 PROCEDURE — 97110 THERAPEUTIC EXERCISES: CPT

## 2025-03-28 PROCEDURE — 97140 MANUAL THERAPY 1/> REGIONS: CPT

## 2025-03-28 PROCEDURE — 97530 THERAPEUTIC ACTIVITIES: CPT

## 2025-03-28 NOTE — PROGRESS NOTES
PHYSICAL / OCCUPATIONAL THERAPY - DAILY TREATMENT NOTE    Patient Name: Onel Livingston    Date: 3/28/2025    : 1960  Insurance: Payor: MEDICARE / Plan: MEDICARE PART A AND B / Product Type: *No Product type* /      Patient  verified Yes     Visit #   Current / Total 2 12-24   Time   In / Out 900 950   Pain   In / Out 2 3-4   Subjective Functional Status/Changes: Patient reporting increased discomfort today in anterior shoulder     TREATMENT AREA =  Left shoulder pain    OBJECTIVE    Ice (UNBILLED):  location/position: Supine to left shoulder     Min Rationale   10 decrease inflammation and decrease pain to improve patient's ability to progress to PLOF and address remaining functional goals.     Skin assessment post-treatment:   Intact     Therapeutic Procedures:    54714 Therapeutic Exercise (timed):  increase ROM, strength, coordination, balance, and proprioception to improve patient's ability to progress to PLOF and address remaining functional goals.   Tx Min Billable or 1:1 Min   (if diff from Tx Min) Details:   15  See flow sheet as applicable     22421 Manual Therapy (timed):  decrease pain, increase ROM, and increase tissue extensibility to improve patient's ability to progress to PLOF and address remaining functional goals.  The manual therapy interventions were performed at a separate and distinct time from the therapeutic activities interventions .   Tx Min Billable or 1:1 Min   (if diff from Tx Min) Details:   10  STM to biceps, pec minor and subscapularis     03176 Therapeutic Activity (timed):  use of dynamic activities replicating functional movements to increase ROM, strength, coordination, balance, and proprioception in order to improve patient's ability to progress to PLOF and address remaining functional goals.   Tx Min Billable or 1:1 Min   (if diff from Tx Min) Details:   15  See flow sheet as applicable       40  [facility] Totals Reminder: bill using total billable min of TIMED therapeutic

## 2025-03-31 ENCOUNTER — HOSPITAL ENCOUNTER (OUTPATIENT)
Facility: HOSPITAL | Age: 65
Setting detail: RECURRING SERIES
Discharge: HOME | End: 2025-04-03
Payer: MEDICARE

## 2025-03-31 PROCEDURE — 97110 THERAPEUTIC EXERCISES: CPT

## 2025-03-31 NOTE — PROGRESS NOTES
PHYSICAL / OCCUPATIONAL THERAPY - DAILY TREATMENT NOTE    Patient Name: Onel Livingston    Date: 3/31/2025    : 1960  Insurance: Payor: MEDICARE / Plan: MEDICARE PART A AND B / Product Type: *No Product type* /      Patient  verified Yes     Visit #   Current / Total 3 12-24   Time   In / Out 9:01 9:54   Pain   In / Out 1/10 3/10   Subjective Functional Status/Changes: Minimal discomfort at rest.  Difficulty raising arm ani-gravity in standing or reaching forward or pulling--weakness.  To have imaging done on left shoulder this Friday (25), then F/U with Ortho.     TREATMENT AREA =  Left shoulder pain    OBJECTIVE    Modalities Rationale:     decrease edema, decrease inflammation, decrease pain, and increase tissue extensibility to improve patient's ability to progress to PLOF and address remaining functional goals.     min [] Estim Unattended, type/location:                                      []  w/ice    []  w/heat    min [] Estim Attended, type/location:                                     []  w/US     []  w/ice    []  w/heat    []  TENS insruct      min []  Mechanical Traction: type/lbs                   []  pro   []  sup   []  int   []  cont    []  before manual    []  after manual    min []  Ultrasound, settings/location:     10 min  unbill [x]  Ice     []  Heat    location/position:   Left shoulder post-Tx/seated with left UE supported.    min []  Paraffin,  details:     min []  Vasopneumatic Device, press/temp:     min []  Whirlpool / Fluido:    If using vaso (only need to measure limb vaso being performed on)      pre-treatment girth :       post-treatment girth :       measured at (landmark location) :      min []  Other:    Skin assessment post-treatment:   Intact      Therapeutic Procedures:    Tx Min Billable or 1:1 Min (if diff from Tx Min) Procedure, Rationale, Specifics   43 08 28120 Therapeutic Exercise (timed):  increase ROM, strength, coordination, balance, and proprioception to 
Other detail:       Objective Information/Functional Measures/Assessment    Good left shoulder AAROM/PROM for shoulder flexion and scaption using pulley and for ER (arm at side) using wand/wall corner.  Limited AROM for forward reach and flexion in standing.  Did well for bent over row and shoulder extension with dumbbell resistance; horizontal ABD in bent over standing was challenging.  Did well with lightly resisted left shldr ER in right sidelying, but poor ability for ABDuction in right sidelying.  Good dumbbell presses in supine and unresisted flexion in supine.  Good conc/ecc flexion in reclined sitting (~40 deg).    Patient will continue to benefit from skilled PT / OT services to modify and progress therapeutic interventions, analyze and address functional mobility deficits, analyze and address ROM deficits, analyze and address strength deficits, analyze and address soft tissue restrictions, analyze and cue for proper movement patterns, analyze and modify for postural abnormalities, and instruct in home and community integration to address functional deficits and attain remaining goals.    Progress toward goals / Updated goals:  []  See Progress Note/Recertification    Short Term Goals: To be accomplished in 4-6 weeks  1) Patient to be independent and compliant with initial HEP to prevent further disability--no problems/questions.  2) Patient will report decreased c/o pain to < or = 2/10 at worst to facilitate increased use of the left UE w/ ADL's--1/10 pre-Tx and 3/10 post-Tx.  3) Patient to report >/= to +2 on GROC indicating improved overhead mobility and independence w/ functional tasks--Assess at 30 days/10 visits post-Tx.  Long Term Goals: To be accomplished in 8-12 weeks  1) Patient to be independent & compliant with progressive HEP in preparation for D/C--will progress as tolerated, but no changes yet.  2) Patient to demonstrate full pain free AROM of the left shoulder to allow for return to all

## 2025-04-04 ENCOUNTER — APPOINTMENT (OUTPATIENT)
Facility: HOSPITAL | Age: 65
End: 2025-04-04
Payer: MEDICARE

## 2025-04-07 ENCOUNTER — HOSPITAL ENCOUNTER (OUTPATIENT)
Facility: HOSPITAL | Age: 65
Setting detail: RECURRING SERIES
Discharge: HOME | End: 2025-04-10
Payer: MEDICARE

## 2025-04-07 PROCEDURE — 97530 THERAPEUTIC ACTIVITIES: CPT

## 2025-04-07 PROCEDURE — 97112 NEUROMUSCULAR REEDUCATION: CPT

## 2025-04-07 PROCEDURE — 97110 THERAPEUTIC EXERCISES: CPT

## 2025-04-07 NOTE — PROGRESS NOTES
for flexion/scaption and forward reaching, but better in reclined (~40 deg) sitting); good AAROM/PROM for overhead motion using pulley.  3.) Patient will decrease Quick DASH to </= to 25% indicating improved functional mobility and [place] with ADL's--reassess at 30 days/10 visits for PN.  4) Patient to increase left UE strength to >/= to 5-/5 to allow for light lifting overhead and return to all ADL's--did well with attempts at shoulder strengthening in gravity altered positons.     Next PN/ RC due 04/18/2025  Recert due (visit number/ date) 06/18/2025.    PLAN  - Continue Plan of Care    Rico Cha PT    4/7/2025    7:40 AM  If an interpreting service was utilized for treatment of this patient, the contents of this document represent the material reviewed with the patient via the .     Future Appointments   Date Time Provider Department Center   4/7/2025  9:40 AM Rico Cha PT [facility]   4/11/2025  9:00 AM Rico Cha PT [facility]   4/14/2025  9:40 AM Marylin Wilson, PT [facility]   4/15/2025  4:00 PM Jordan Anderson MD [facility] [place] [facility]   4/18/2025  9:00 AM Marylin Wilson, PT [facility]   4/21/2025  9:40 AM Rico Cha PT [facility]   4/25/2025  9:00 AM Jeferson Zapata, hospitals [facility]

## 2025-04-11 ENCOUNTER — APPOINTMENT (OUTPATIENT)
Facility: HOSPITAL | Age: 65
End: 2025-04-11
Payer: MEDICARE

## 2025-04-14 ENCOUNTER — APPOINTMENT (OUTPATIENT)
Facility: HOSPITAL | Age: 65
End: 2025-04-14
Payer: MEDICARE

## 2025-04-18 ENCOUNTER — APPOINTMENT (OUTPATIENT)
Facility: HOSPITAL | Age: 65
End: 2025-04-18
Payer: MEDICARE

## 2025-04-21 ENCOUNTER — APPOINTMENT (OUTPATIENT)
Facility: HOSPITAL | Age: 65
End: 2025-04-21
Payer: MEDICARE

## 2025-04-25 ENCOUNTER — APPOINTMENT (OUTPATIENT)
Facility: HOSPITAL | Age: 65
End: 2025-04-25
Payer: MEDICARE

## 2025-07-22 ENCOUNTER — HOSPITAL ENCOUNTER (OUTPATIENT)
Facility: HOSPITAL | Age: 65
Discharge: HOME | End: 2025-07-25
Attending: STUDENT IN AN ORGANIZED HEALTH CARE EDUCATION/TRAINING PROGRAM
Payer: MEDICARE

## 2025-07-22 DIAGNOSIS — N28.1 RENAL CYST, RIGHT: ICD-10-CM

## 2025-07-22 PROCEDURE — 3430000000 HC RX DIAGNOSTIC RADIOPHARMACEUTICAL: Performed by: STUDENT IN AN ORGANIZED HEALTH CARE EDUCATION/TRAINING PROGRAM

## 2025-07-22 PROCEDURE — A9562 TC99M MERTIATIDE: HCPCS | Performed by: STUDENT IN AN ORGANIZED HEALTH CARE EDUCATION/TRAINING PROGRAM

## 2025-07-22 PROCEDURE — 6360000002 HC RX W HCPCS: Performed by: STUDENT IN AN ORGANIZED HEALTH CARE EDUCATION/TRAINING PROGRAM

## 2025-07-22 RX ORDER — FUROSEMIDE 10 MG/ML
40 INJECTION INTRAMUSCULAR; INTRAVENOUS ONCE
Status: COMPLETED | OUTPATIENT
Start: 2025-07-22 | End: 2025-07-22

## 2025-07-22 RX ADMIN — Medication 7.7 MILLICURIE: at 12:44

## 2025-07-22 RX ADMIN — FUROSEMIDE 40 MG: 10 INJECTION, SOLUTION INTRAMUSCULAR; INTRAVENOUS at 12:53
